# Patient Record
Sex: FEMALE | Race: BLACK OR AFRICAN AMERICAN | Employment: FULL TIME | URBAN - METROPOLITAN AREA
[De-identification: names, ages, dates, MRNs, and addresses within clinical notes are randomized per-mention and may not be internally consistent; named-entity substitution may affect disease eponyms.]

---

## 2018-06-26 ENCOUNTER — APPOINTMENT (OUTPATIENT)
Dept: GENERAL RADIOLOGY | Age: 58
End: 2018-06-26
Attending: PHYSICIAN ASSISTANT
Payer: SELF-PAY

## 2018-06-26 ENCOUNTER — HOSPITAL ENCOUNTER (EMERGENCY)
Age: 58
Discharge: HOME | End: 2018-06-26
Attending: EMERGENCY MEDICINE | Admitting: EMERGENCY MEDICINE
Payer: SELF-PAY

## 2018-06-26 VITALS
TEMPERATURE: 98 F | SYSTOLIC BLOOD PRESSURE: 166 MMHG | WEIGHT: 158 LBS | OXYGEN SATURATION: 96 % | DIASTOLIC BLOOD PRESSURE: 85 MMHG | RESPIRATION RATE: 16 BRPM | BODY MASS INDEX: 26.33 KG/M2 | HEIGHT: 65 IN | HEART RATE: 105 BPM

## 2018-06-26 DIAGNOSIS — J44.1 ACUTE EXACERBATION OF CHRONIC OBSTRUCTIVE PULMONARY DISEASE (COPD) (HCC): Primary | ICD-10-CM

## 2018-06-26 LAB
ANION GAP SERPL CALC-SCNC: 8 MMOL/L (ref 5–15)
BASOPHILS # BLD: 0 K/UL (ref 0–0.1)
BASOPHILS NFR BLD: 0 % (ref 0–1)
BUN SERPL-MCNC: 9 MG/DL (ref 6–20)
BUN/CREAT SERPL: 12 (ref 12–20)
CALCIUM SERPL-MCNC: 8.7 MG/DL (ref 8.5–10.1)
CHLORIDE SERPL-SCNC: 109 MMOL/L (ref 97–108)
CO2 SERPL-SCNC: 26 MMOL/L (ref 21–32)
CREAT SERPL-MCNC: 0.76 MG/DL (ref 0.55–1.02)
DIFFERENTIAL METHOD BLD: ABNORMAL
EOSINOPHIL # BLD: 0.6 K/UL (ref 0–0.4)
EOSINOPHIL NFR BLD: 7 % (ref 0–7)
ERYTHROCYTE [DISTWIDTH] IN BLOOD BY AUTOMATED COUNT: 13.4 % (ref 11.5–14.5)
GLUCOSE SERPL-MCNC: 86 MG/DL (ref 65–100)
HCT VFR BLD AUTO: 42.2 % (ref 35–47)
HGB BLD-MCNC: 13.7 G/DL (ref 11.5–16)
IMM GRANULOCYTES # BLD: 0 K/UL (ref 0–0.04)
IMM GRANULOCYTES NFR BLD AUTO: 0 % (ref 0–0.5)
LYMPHOCYTES # BLD: 3.2 K/UL (ref 0.8–3.5)
LYMPHOCYTES NFR BLD: 39 % (ref 12–49)
MCH RBC QN AUTO: 30.4 PG (ref 26–34)
MCHC RBC AUTO-ENTMCNC: 32.5 G/DL (ref 30–36.5)
MCV RBC AUTO: 93.8 FL (ref 80–99)
MONOCYTES # BLD: 0.6 K/UL (ref 0–1)
MONOCYTES NFR BLD: 7 % (ref 5–13)
NEUTS SEG # BLD: 3.8 K/UL (ref 1.8–8)
NEUTS SEG NFR BLD: 46 % (ref 32–75)
NRBC # BLD: 0 K/UL (ref 0–0.01)
NRBC BLD-RTO: 0 PER 100 WBC
PLATELET # BLD AUTO: 250 K/UL (ref 150–400)
PMV BLD AUTO: 10 FL (ref 8.9–12.9)
POTASSIUM SERPL-SCNC: 3.2 MMOL/L (ref 3.5–5.1)
RBC # BLD AUTO: 4.5 M/UL (ref 3.8–5.2)
SODIUM SERPL-SCNC: 143 MMOL/L (ref 136–145)
TROPONIN I SERPL-MCNC: <0.05 NG/ML
WBC # BLD AUTO: 8.1 K/UL (ref 3.6–11)

## 2018-06-26 PROCEDURE — 74011250637 HC RX REV CODE- 250/637: Performed by: EMERGENCY MEDICINE

## 2018-06-26 PROCEDURE — 77030029684 HC NEB SM VOL KT MONA -A

## 2018-06-26 PROCEDURE — 93005 ELECTROCARDIOGRAM TRACING: CPT

## 2018-06-26 PROCEDURE — 74011000250 HC RX REV CODE- 250: Performed by: EMERGENCY MEDICINE

## 2018-06-26 PROCEDURE — 84484 ASSAY OF TROPONIN QUANT: CPT | Performed by: PHYSICIAN ASSISTANT

## 2018-06-26 PROCEDURE — 71046 X-RAY EXAM CHEST 2 VIEWS: CPT

## 2018-06-26 PROCEDURE — 85025 COMPLETE CBC W/AUTO DIFF WBC: CPT | Performed by: PHYSICIAN ASSISTANT

## 2018-06-26 PROCEDURE — 36415 COLL VENOUS BLD VENIPUNCTURE: CPT | Performed by: PHYSICIAN ASSISTANT

## 2018-06-26 PROCEDURE — 74011636637 HC RX REV CODE- 636/637: Performed by: EMERGENCY MEDICINE

## 2018-06-26 PROCEDURE — 99283 EMERGENCY DEPT VISIT LOW MDM: CPT

## 2018-06-26 PROCEDURE — 80048 BASIC METABOLIC PNL TOTAL CA: CPT | Performed by: PHYSICIAN ASSISTANT

## 2018-06-26 PROCEDURE — 94640 AIRWAY INHALATION TREATMENT: CPT

## 2018-06-26 RX ORDER — GUAIFENESIN 600 MG/1
600 TABLET, EXTENDED RELEASE ORAL EVERY 12 HOURS
Status: DISCONTINUED | OUTPATIENT
Start: 2018-06-26 | End: 2018-06-27 | Stop reason: HOSPADM

## 2018-06-26 RX ORDER — AZITHROMYCIN 250 MG/1
TABLET, FILM COATED ORAL
Qty: 6 TAB | Refills: 0 | Status: SHIPPED | OUTPATIENT
Start: 2018-06-26 | End: 2018-07-01

## 2018-06-26 RX ORDER — PREDNISONE 20 MG/1
60 TABLET ORAL DAILY
Qty: 12 TAB | Refills: 0 | Status: SHIPPED | OUTPATIENT
Start: 2018-06-26 | End: 2018-06-30

## 2018-06-26 RX ORDER — IPRATROPIUM BROMIDE AND ALBUTEROL SULFATE 2.5; .5 MG/3ML; MG/3ML
3 SOLUTION RESPIRATORY (INHALATION)
Status: COMPLETED | OUTPATIENT
Start: 2018-06-26 | End: 2018-06-26

## 2018-06-26 RX ORDER — GUAIFENESIN 600 MG/1
600 TABLET, EXTENDED RELEASE ORAL 2 TIMES DAILY
Qty: 20 TAB | Refills: 0 | Status: SHIPPED | OUTPATIENT
Start: 2018-06-26 | End: 2022-03-24

## 2018-06-26 RX ORDER — ALBUTEROL SULFATE 0.83 MG/ML
2.5 SOLUTION RESPIRATORY (INHALATION)
Qty: 24 EACH | Refills: 0 | Status: SHIPPED | OUTPATIENT
Start: 2018-06-26

## 2018-06-26 RX ORDER — PREDNISONE 20 MG/1
60 TABLET ORAL
Status: COMPLETED | OUTPATIENT
Start: 2018-06-26 | End: 2018-06-26

## 2018-06-26 RX ADMIN — ALBUTEROL SULFATE 1 DOSE: 2.5 SOLUTION RESPIRATORY (INHALATION) at 19:32

## 2018-06-26 RX ADMIN — PREDNISONE 60 MG: 20 TABLET ORAL at 20:34

## 2018-06-26 RX ADMIN — IPRATROPIUM BROMIDE AND ALBUTEROL SULFATE 3 ML: .5; 3 SOLUTION RESPIRATORY (INHALATION) at 19:55

## 2018-06-26 RX ADMIN — GUAIFENESIN 600 MG: 600 TABLET, EXTENDED RELEASE ORAL at 20:34

## 2018-06-26 NOTE — ED PROVIDER NOTES
HPI Comments: 56 y.o. female with no significant past medical history who presents ambulatory from home with chief complaint of shortness of breath. Patient reports 2-day history of SOB, worse with exertion, with associated wheezing, chest tightness, and dry cough. Patient used breathing treatment and rescue inhaler last night with some relief. Patient states episodes are typically precipitated by rain. Patient endorses history of bronchitis. She was evaluated at [facility] ED last month for the same and prescribed steroid therapy and \"cough syrup (she) took Q12 hrs\" with relief. Patient denies history of asthma, COPD and cardiac disease. She is not followed by a PCP. She is allergic to sulfa. She specifically denies fevers, n/v/d, and back pain. There are no other acute medical concerns at this time. Social hx: Former tobacco smoker (quit 3/2018); Denies EtOH use; Denies illicit drug use  PCP: Matty Chang MD    Note written by Quentin Fitch, as dictated by Valerio Leahy MD 6:35 PM        The history is provided by the patient. No  was used. History reviewed. No pertinent past medical history. History reviewed. No pertinent surgical history. History reviewed. No pertinent family history. Social History     Social History    Marital status:      Spouse name: N/A    Number of children: N/A    Years of education: N/A     Occupational History    Not on file. Social History Main Topics    Smoking status: Former Smoker     Quit date: 2/26/2018    Smokeless tobacco: Never Used    Alcohol use No    Drug use: No    Sexual activity: Not on file     Other Topics Concern    Not on file     Social History Narrative    No narrative on file         ALLERGIES: Sulfa (sulfonamide antibiotics)    Review of Systems   Constitutional: Negative for fever. HENT: Negative for facial swelling. Eyes: Negative for visual disturbance.    Respiratory: Positive for cough, chest tightness, shortness of breath and wheezing. Cardiovascular: Negative for chest pain. Gastrointestinal: Negative for abdominal pain, diarrhea, nausea and vomiting. Genitourinary: Negative for dysuria. Musculoskeletal: Negative for arthralgias and back pain. Skin: Negative for rash. Neurological: Negative for dizziness. Hematological: Negative for adenopathy. Psychiatric/Behavioral: Negative for suicidal ideas. All other systems reviewed and are negative. Vitals:    06/26/18 1754   BP: 163/89   Pulse: (!) 105   Resp: 22   Temp: 97.8 °F (36.6 °C)   SpO2: 99%   Weight: 71.7 kg (158 lb)   Height: 5' 5\" (1.651 m)            Physical Exam   Constitutional: She is oriented to person, place, and time. She appears well-developed and well-nourished. No distress. HENT:   Head: Normocephalic and atraumatic. Mouth/Throat: Oropharynx is clear and moist.   Eyes: Pupils are equal, round, and reactive to light. No scleral icterus. Neck: Normal range of motion. Neck supple. No thyromegaly present. Cardiovascular: Normal rate, regular rhythm, normal heart sounds and intact distal pulses. No murmur heard. Pulmonary/Chest: Effort normal. No respiratory distress. She has decreased breath sounds. She has wheezes (inspiratory and expiratory). Abdominal: Soft. Bowel sounds are normal. She exhibits no distension. There is no tenderness. Musculoskeletal: Normal range of motion. She exhibits no edema. Neurological: She is alert and oriented to person, place, and time. Skin: Skin is warm and dry. No rash noted. She is not diaphoretic. Nursing note and vitals reviewed. Note written by Quentin Atkins, as dictated by Jia Shetty MD 6:35 PM    MDM  Number of Diagnoses or Management Options  Acute exacerbation of chronic obstructive pulmonary disease (COPD) [facility]):   Diagnosis management comments: A:  Wheezing and coughing since yesterday.   Wheezing and decreased breath sounds on exam.  VS stable with slight tachycardia. Pt has h/o bronchitis. Quit cigs 3 months ago. P:  ecg  cxr  Labs  duoneb  Prednisone  reassess        ED Course       Procedures    ED EKG interpretation:  Rhythm: sinus tach Rate (approx.): 104. Axis: normal.  ST segment:  No concerning ST elevations or depressions. This EKG was interpreted by Anoop Sow MD,ED Provider. Chest X-ray, 2 view:  Chest xray, PA and Lat, shows no signs of acute disease. This CXR was interpreted by Anoop Sow MD, ED Provider. Labs unremarkable. 8:58 PM  Pt still wheezing after 2 neb treatments and prednisone. Slightly improved from before. VS stable. Pt offered admission for add'l nebs and observation but she prefers to be discharge home. She has alb nebs at home. Agrees to return if symptoms worsen.

## 2018-06-26 NOTE — ED NOTES

## 2018-06-26 NOTE — ED TRIAGE NOTES
Pt reports onset of SOB yesterday, ZELAYA with ambulation into ED, congested. Dry cough with chest pain. Denies dizziness, N/V, fevers or chills.

## 2018-06-27 LAB
ATRIAL RATE: 104 BPM
CALCULATED P AXIS, ECG09: 30 DEGREES
CALCULATED R AXIS, ECG10: 49 DEGREES
CALCULATED T AXIS, ECG11: 63 DEGREES
DIAGNOSIS, 93000: NORMAL
P-R INTERVAL, ECG05: 122 MS
Q-T INTERVAL, ECG07: 364 MS
QRS DURATION, ECG06: 78 MS
QTC CALCULATION (BEZET), ECG08: 478 MS
VENTRICULAR RATE, ECG03: 104 BPM

## 2018-06-27 NOTE — ED NOTES
Dr. Fide Mullins gave and reviewed discharge instructions with patient. Patient verbalizes understanding of discharge instructions. Pt alert and oriented, appears in no acute distress, respirations equal and unlabored. Ambulatory upon discharge with steady gait.

## 2018-06-27 NOTE — DISCHARGE INSTRUCTIONS
Chronic Obstructive Pulmonary Disease (COPD): Care Instructions  Your Care Instructions    Chronic obstructive pulmonary disease (COPD) is a general term for a group of lung diseases, including emphysema and chronic bronchitis. People with COPD have decreased airflow in and out of the lungs, which makes it hard to breathe. The airways also can get clogged with thick mucus. Cigarette smoking is a major cause of COPD. Although there is no cure for COPD, you can slow its progress. Following your treatment plan and taking care of yourself can help you feel better and live longer. Follow-up care is a key part of your treatment and safety. Be sure to make and go to all appointments, and call your doctor if you are having problems. It's also a good idea to know your test results and keep a list of the medicines you take. How can you care for yourself at home? ?Staying healthy  ? · Do not smoke. This is the most important step you can take to prevent more damage to your lungs. If you need help quitting, talk to your doctor about stop-smoking programs and medicines. These can increase your chances of quitting for good. ? · Avoid colds and flu. Get a pneumococcal vaccine shot. If you have had one before, ask your doctor whether you need a second dose. Get the flu vaccine every fall. If you must be around people with colds or the flu, wash your hands often. ? · Avoid secondhand smoke, air pollution, and high altitudes. Also avoid cold, dry air and hot, humid air. Stay at home with your windows closed when air pollution is bad. ?Medicines and oxygen therapy  ? · Take your medicines exactly as prescribed. Call your doctor if you think you are having a problem with your medicine. ? · You may be taking medicines such as:  ¨ Bronchodilators. These help open your airways and make breathing easier. Bronchodilators are either short-acting (work for 6 to 9 hours) or long-acting (work for 24 hours).  You inhale most bronchodilators, so they start to act quickly. Always carry your quick-relief inhaler with you in case you need it while you are away from home. ¨ Corticosteroids (prednisone, budesonide). These reduce airway inflammation. They come in pill or inhaled form. You must take these medicines every day for them to work well. ? · A spacer may help you get more inhaled medicine to your lungs. Ask your doctor or pharmacist if a spacer is right for you. If it is, ask how to use it properly. ? · Do not take any vitamins, over-the-counter medicine, or herbal products without talking to your doctor first.   ? · If your doctor prescribed antibiotics, take them as directed. Do not stop taking them just because you feel better. You need to take the full course of antibiotics. ? · Oxygen therapy boosts the amount of oxygen in your blood and helps you breathe easier. Use the flow rate your doctor has recommended, and do not change it without talking to your doctor first.   Activity  ? · Get regular exercise. Walking is an easy way to get exercise. Start out slowly, and walk a little more each day. ? · Pay attention to your breathing. You are exercising too hard if you cannot talk while you are exercising. ? · Take short rest breaks when doing household chores and other activities. ? · Learn breathing methods-such as breathing through pursed lips-to help you become less short of breath. ? · If your doctor has not set you up with a pulmonary rehabilitation program, talk to him or her about whether rehab is right for you. Rehab includes exercise programs, education about your disease and how to manage it, help with diet and other changes, and emotional support. Diet  ? · Eat regular, healthy meals. Use bronchodilators about 1 hour before you eat to make it easier to eat. Eat several small meals instead of three large ones. Drink beverages at the end of the meal. Avoid foods that are hard to chew.    ? · Eat foods that contain protein so that you do not lose muscle mass. ? · Talk with your doctor if you gain too much weight or if you lose weight without trying. ?Mental health  ? · Talk to your family, friends, or a therapist about your feelings. It is normal to feel frightened, angry, hopeless, helpless, and even guilty. Talking openly about bad feelings can help you cope. If these feelings last, talk to your doctor. When should you call for help? Call 911 anytime you think you may need emergency care. For example, call if:  ? · You have severe trouble breathing. ?Call your doctor now or seek immediate medical care if:  ? · You have new or worse trouble breathing. ? · You cough up blood. ? · You have a fever. ? Watch closely for changes in your health, and be sure to contact your doctor if:  ? · You cough more deeply or more often, especially if you notice more mucus or a change in the color of your mucus. ? · You have new or worse swelling in your legs or belly. ? · You are not getting better as expected. Where can you learn more? Go to http://popPureCarsdeidra.info/. Jemma Enrique in the search box to learn more about \"Chronic Obstructive Pulmonary Disease (COPD): Care Instructions. \"  Current as of: May 12, 2017  Content Version: 11.4  © 4988-8573 [employer]. Care instructions adapted under license by [employer] (which disclaims liability or warranty for this information). If you have questions about a medical condition or this instruction, always ask your healthcare professional. [place] [address] any warranty or liability for your use of this information. We hope that we have addressed all of your medical concerns. The examination and treatment you received in the Emergency Department were for an emergent problem and were not intended as complete care. It is important that you follow up with your healthcare provider(s) for ongoing care.  If your symptoms worsen or do not improve as expected, and you are unable to reach your usual health care provider(s), you should return to the Emergency Department. Today's healthcare is undergoing tremendous change, and patient satisfaction surveys are one of the many tools to assess the quality of medical care. You may receive a survey from the [employer] organization regarding your experience in the Emergency Department. I hope that your experience has been completely positive, particularly the medical care that I provided. As such, please participate in the survey; anything less than excellent does not meet my expectations or intentions. [facility] and [facility] participate in nationally recognized quality of care measures. If your blood pressure is greater than 120/80, as reported below, we urge that you seek medical care to address the potential of high blood pressure, commonly known as hypertension. Hypertension can be hereditary or can be caused by certain medical conditions, pain, stress, or \"white coat syndrome. \"       Please make an appointment with your health care provider(s) for follow up of your Emergency Department visit. VITALS:   Patient Vitals for the past 8 hrs:   Temp Pulse Resp BP SpO2   06/26/18 1932 - - - - 99 %   06/26/18 1754 97.8 °F (36.6 °C) (!) 105 22 163/89 99 %          Thank you for allowing us to provide you with medical care today. We realize that you have many choices for your emergency care needs. Please choose us in the future for any continued health care needs.       MD STERLING [facility]: [phone]            Recent Results (from the past 24 hour(s))   EKG, 12 LEAD, INITIAL    Collection Time: 06/26/18  6:00 PM   Result Value Ref Range    Ventricular Rate 104 BPM    Atrial Rate 104 BPM    P-R Interval 122 ms    QRS Duration 78 ms    Q-T Interval 364 ms    QTC Calculation (Bezet) 478 ms    Calculated P Axis 30 degrees    Calculated R Axis 49 degrees    Calculated T Axis 63 degrees    Diagnosis       Sinus tachycardia  Nonspecific ST and T wave abnormality     CBC WITH AUTOMATED DIFF    Collection Time: 06/26/18  6:04 PM   Result Value Ref Range    WBC 8.1 3.6 - 11.0 K/uL    RBC 4.50 3.80 - 5.20 M/uL    HGB 13.7 11.5 - 16.0 g/dL    HCT 42.2 35.0 - 47.0 %    MCV 93.8 80.0 - 99.0 FL    MCH 30.4 26.0 - 34.0 PG    MCHC 32.5 30.0 - 36.5 g/dL    RDW 13.4 11.5 - 14.5 %    PLATELET 573 873 - 859 K/uL    MPV 10.0 8.9 - 12.9 FL    NRBC 0.0 0  WBC    ABSOLUTE NRBC 0.00 0.00 - 0.01 K/uL    NEUTROPHILS 46 32 - 75 %    LYMPHOCYTES 39 12 - 49 %    MONOCYTES 7 5 - 13 %    EOSINOPHILS 7 0 - 7 %    BASOPHILS 0 0 - 1 %    IMMATURE GRANULOCYTES 0 0.0 - 0.5 %    ABS. NEUTROPHILS 3.8 1.8 - 8.0 K/UL    ABS. LYMPHOCYTES 3.2 0.8 - 3.5 K/UL    ABS. MONOCYTES 0.6 0.0 - 1.0 K/UL    ABS. EOSINOPHILS 0.6 (H) 0.0 - 0.4 K/UL    ABS. BASOPHILS 0.0 0.0 - 0.1 K/UL    ABS. IMM. GRANS. 0.0 0.00 - 0.04 K/UL    DF AUTOMATED     METABOLIC PANEL, BASIC    Collection Time: 06/26/18  6:04 PM   Result Value Ref Range    Sodium 143 136 - 145 mmol/L    Potassium 3.2 (L) 3.5 - 5.1 mmol/L    Chloride 109 (H) 97 - 108 mmol/L    CO2 26 21 - 32 mmol/L    Anion gap 8 5 - 15 mmol/L    Glucose 86 65 - 100 mg/dL    BUN 9 6 - 20 MG/DL    Creatinine 0.76 0.55 - 1.02 MG/DL    BUN/Creatinine ratio 12 12 - 20      GFR est AA >60 >60 ml/min/1.73m2    GFR est non-AA >60 >60 ml/min/1.73m2    Calcium 8.7 8.5 - 10.1 MG/DL   TROPONIN I    Collection Time: 06/26/18  6:04 PM   Result Value Ref Range    Troponin-I, Qt. <0.05 <0.05 ng/mL       Xr Chest Pa Lat    Result Date: 6/26/2018  EXAM:  XR CHEST PA LAT INDICATION:   shortness of breath COMPARISON: 2008. FINDINGS: PA and lateral radiographs of the chest demonstrate coarsening of the lung markings in the right lower lobe.  The cardiac and mediastinal contours and pulmonary vascularity are normal.  The bones and soft tissues are within normal limits.      IMPRESSION: Right lower lobe bronchitis versus asthma versus subsegmental atelectasis

## 2022-03-24 ENCOUNTER — OFFICE VISIT (OUTPATIENT)
Dept: ORTHOPEDIC SURGERY | Age: 62
End: 2022-03-24
Payer: MEDICAID

## 2022-03-24 VITALS — WEIGHT: 158 LBS | HEIGHT: 60 IN | BODY MASS INDEX: 31.02 KG/M2

## 2022-03-24 DIAGNOSIS — M25.512 ACUTE PAIN OF LEFT SHOULDER: Primary | ICD-10-CM

## 2022-03-24 DIAGNOSIS — M54.12 CERVICAL RADICULOPATHY: ICD-10-CM

## 2022-03-24 DIAGNOSIS — M75.42 IMPINGEMENT SYNDROME OF LEFT SHOULDER: ICD-10-CM

## 2022-03-24 PROCEDURE — 99203 OFFICE O/P NEW LOW 30 MIN: CPT | Performed by: ORTHOPAEDIC SURGERY

## 2022-03-24 RX ORDER — DICLOFENAC SODIUM 75 MG/1
75 TABLET, DELAYED RELEASE ORAL 2 TIMES DAILY
Qty: 60 TABLET | Refills: 3 | Status: SHIPPED | OUTPATIENT
Start: 2022-03-24 | End: 2022-07-26

## 2022-03-24 RX ORDER — MONTELUKAST SODIUM 10 MG/1
10 TABLET ORAL DAILY
COMMUNITY
Start: 2022-03-10

## 2022-03-24 NOTE — Clinical Note
3/24/2022    Patient: Nehemiah Tom   YOB: 1960   Date of Visit: 3/24/2022     Richi Davis MD  Via     Dear Richi Davis MD,      Thank you for referring Ms. Nehemiah Tom to [facility] for evaluation. My notes for this consultation are attached. If you have questions, please do not hesitate to call me. I look forward to following your patient along with you.       Sincerely,    Rashawn Macias, DO home
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
## 2022-05-07 ENCOUNTER — HOSPITAL ENCOUNTER (INPATIENT)
Age: 62
LOS: 1 days | Discharge: LEFT BEFORE DISCHARGE | DRG: 420 | End: 2022-05-09
Attending: EMERGENCY MEDICINE | Admitting: INTERNAL MEDICINE
Payer: MEDICAID

## 2022-05-07 DIAGNOSIS — E11.10 DIABETIC KETOACIDOSIS WITHOUT COMA ASSOCIATED WITH TYPE 2 DIABETES MELLITUS (HCC): Primary | ICD-10-CM

## 2022-05-07 DIAGNOSIS — R05.9 COUGH: ICD-10-CM

## 2022-05-07 LAB
ALBUMIN SERPL-MCNC: 3.5 G/DL (ref 3.5–5)
ALBUMIN/GLOB SERPL: 0.8 {RATIO} (ref 1.1–2.2)
ALP SERPL-CCNC: 129 U/L (ref 45–117)
ALT SERPL-CCNC: 20 U/L (ref 12–78)
ANION GAP SERPL CALC-SCNC: 20 MMOL/L (ref 5–15)
APPEARANCE UR: CLEAR
AST SERPL-CCNC: 9 U/L (ref 15–37)
BACTERIA URNS QL MICRO: NEGATIVE /HPF
BASE DEFICIT BLDV-SCNC: 13.2 MMOL/L
BASOPHILS # BLD: 0.1 K/UL (ref 0–0.1)
BASOPHILS NFR BLD: 1 % (ref 0–1)
BILIRUB SERPL-MCNC: 0.5 MG/DL (ref 0.2–1)
BILIRUB UR QL: NEGATIVE
BUN SERPL-MCNC: 10 MG/DL (ref 6–20)
BUN/CREAT SERPL: 11 (ref 12–20)
CALCIUM SERPL-MCNC: 10.2 MG/DL (ref 8.5–10.1)
CHLORIDE SERPL-SCNC: 98 MMOL/L (ref 97–108)
CO2 SERPL-SCNC: 13 MMOL/L (ref 21–32)
COLOR UR: ABNORMAL
COMMENT, HOLDF: NORMAL
CREAT SERPL-MCNC: 0.93 MG/DL (ref 0.55–1.02)
D DIMER PPP FEU-MCNC: 0.57 MG/L FEU (ref 0–0.65)
DIFFERENTIAL METHOD BLD: ABNORMAL
EOSINOPHIL # BLD: 0 K/UL (ref 0–0.4)
EOSINOPHIL NFR BLD: 0 % (ref 0–7)
EPITH CASTS URNS QL MICRO: ABNORMAL /LPF
ERYTHROCYTE [DISTWIDTH] IN BLOOD BY AUTOMATED COUNT: 13.3 % (ref 11.5–14.5)
GLOBULIN SER CALC-MCNC: 4.3 G/DL (ref 2–4)
GLUCOSE BLD STRIP.AUTO-MCNC: 325 MG/DL (ref 65–117)
GLUCOSE SERPL-MCNC: 386 MG/DL (ref 65–100)
GLUCOSE UR STRIP.AUTO-MCNC: >1000 MG/DL
HCO3 BLDV-SCNC: 11.2 MMOL/L (ref 23–28)
HCT VFR BLD AUTO: 45.3 % (ref 35–47)
HGB BLD-MCNC: 15.6 G/DL (ref 11.5–16)
HGB UR QL STRIP: ABNORMAL
HYALINE CASTS URNS QL MICRO: ABNORMAL /LPF (ref 0–2)
IMM GRANULOCYTES # BLD AUTO: 0 K/UL
IMM GRANULOCYTES NFR BLD AUTO: 0 %
KETONES UR QL STRIP.AUTO: >80 MG/DL
LACTATE SERPL-SCNC: 0.9 MMOL/L (ref 0.4–2)
LEUKOCYTE ESTERASE UR QL STRIP.AUTO: NEGATIVE
LYMPHOCYTES # BLD: 4 K/UL (ref 0.8–3.5)
LYMPHOCYTES NFR BLD: 33 % (ref 12–49)
MCH RBC QN AUTO: 31.1 PG (ref 26–34)
MCHC RBC AUTO-ENTMCNC: 34.4 G/DL (ref 30–36.5)
MCV RBC AUTO: 90.4 FL (ref 80–99)
METAMYELOCYTES NFR BLD MANUAL: 1 %
MONOCYTES # BLD: 0.5 K/UL (ref 0–1)
MONOCYTES NFR BLD: 4 % (ref 5–13)
MYELOCYTES NFR BLD MANUAL: 2 %
NEUTS SEG # BLD: 7.1 K/UL (ref 1.8–8)
NEUTS SEG NFR BLD: 59 % (ref 32–75)
NITRITE UR QL STRIP.AUTO: NEGATIVE
NRBC # BLD: 0 K/UL (ref 0–0.01)
NRBC BLD-RTO: 0 PER 100 WBC
PCO2 BLDV: 22.8 MMHG (ref 41–51)
PH BLDV: 7.3 [PH] (ref 7.32–7.42)
PH UR STRIP: 5 [PH] (ref 5–8)
PLATELET # BLD AUTO: 303 K/UL (ref 150–400)
PMV BLD AUTO: 10.8 FL (ref 8.9–12.9)
PO2 BLDV: 25 MMHG (ref 25–40)
POTASSIUM SERPL-SCNC: 3.4 MMOL/L (ref 3.5–5.1)
PROT SERPL-MCNC: 7.8 G/DL (ref 6.4–8.2)
PROT UR STRIP-MCNC: 30 MG/DL
RBC # BLD AUTO: 5.01 M/UL (ref 3.8–5.2)
RBC #/AREA URNS HPF: ABNORMAL /HPF (ref 0–5)
RBC MORPH BLD: ABNORMAL
SAMPLES BEING HELD,HOLD: NORMAL
SAO2 % BLDV: 40.4 % (ref 65–88)
SERVICE CMNT-IMP: ABNORMAL
SERVICE CMNT-IMP: ABNORMAL
SODIUM SERPL-SCNC: 131 MMOL/L (ref 136–145)
SP GR UR REFRACTOMETRY: 1.02 (ref 1–1.03)
SPECIMEN TYPE: ABNORMAL
TROPONIN-HIGH SENSITIVITY: 12 NG/L (ref 0–51)
UR CULT HOLD, URHOLD: NORMAL
UROBILINOGEN UR QL STRIP.AUTO: 1 EU/DL (ref 0.2–1)
WBC # BLD AUTO: 12.1 K/UL (ref 3.6–11)
WBC URNS QL MICRO: ABNORMAL /HPF (ref 0–4)

## 2022-05-07 PROCEDURE — 82803 BLOOD GASES ANY COMBINATION: CPT

## 2022-05-07 PROCEDURE — 85025 COMPLETE CBC W/AUTO DIFF WBC: CPT

## 2022-05-07 PROCEDURE — 80053 COMPREHEN METABOLIC PANEL: CPT

## 2022-05-07 PROCEDURE — 84484 ASSAY OF TROPONIN QUANT: CPT

## 2022-05-07 PROCEDURE — 83605 ASSAY OF LACTIC ACID: CPT

## 2022-05-07 PROCEDURE — 74011250636 HC RX REV CODE- 250/636: Performed by: EMERGENCY MEDICINE

## 2022-05-07 PROCEDURE — 36415 COLL VENOUS BLD VENIPUNCTURE: CPT

## 2022-05-07 PROCEDURE — 85379 FIBRIN DEGRADATION QUANT: CPT

## 2022-05-07 PROCEDURE — 82962 GLUCOSE BLOOD TEST: CPT

## 2022-05-07 PROCEDURE — 99285 EMERGENCY DEPT VISIT HI MDM: CPT

## 2022-05-07 PROCEDURE — 81001 URINALYSIS AUTO W/SCOPE: CPT

## 2022-05-07 PROCEDURE — 93005 ELECTROCARDIOGRAM TRACING: CPT

## 2022-05-07 RX ORDER — MAGNESIUM SULFATE 100 %
4 CRYSTALS MISCELLANEOUS AS NEEDED
Status: DISCONTINUED | OUTPATIENT
Start: 2022-05-07 | End: 2022-05-08

## 2022-05-07 RX ORDER — DEXTROSE MONOHYDRATE 100 MG/ML
0-250 INJECTION, SOLUTION INTRAVENOUS AS NEEDED
Status: DISCONTINUED | OUTPATIENT
Start: 2022-05-07 | End: 2022-05-08

## 2022-05-07 RX ORDER — INSULIN LISPRO 100 [IU]/ML
INJECTION, SOLUTION INTRAVENOUS; SUBCUTANEOUS
Status: DISCONTINUED | OUTPATIENT
Start: 2022-05-08 | End: 2022-05-08

## 2022-05-07 RX ADMIN — SODIUM CHLORIDE 1000 ML: 9 INJECTION, SOLUTION INTRAVENOUS at 21:15

## 2022-05-08 PROBLEM — E11.10 DIABETIC KETOACIDOSIS (HCC): Status: ACTIVE | Noted: 2022-05-08

## 2022-05-08 LAB
ADMINISTERED INITIALS, ADMINIT: NORMAL
ANION GAP SERPL CALC-SCNC: 13 MMOL/L (ref 5–15)
ANION GAP SERPL CALC-SCNC: 15 MMOL/L (ref 5–15)
ANION GAP SERPL CALC-SCNC: 9 MMOL/L (ref 5–15)
ATRIAL RATE: 107 BPM
BUN SERPL-MCNC: 7 MG/DL (ref 6–20)
BUN SERPL-MCNC: 7 MG/DL (ref 6–20)
BUN SERPL-MCNC: 9 MG/DL (ref 6–20)
BUN/CREAT SERPL: 11 (ref 12–20)
BUN/CREAT SERPL: 12 (ref 12–20)
BUN/CREAT SERPL: 15 (ref 12–20)
CALCIUM SERPL-MCNC: 8.5 MG/DL (ref 8.5–10.1)
CALCIUM SERPL-MCNC: 8.8 MG/DL (ref 8.5–10.1)
CALCIUM SERPL-MCNC: 9.5 MG/DL (ref 8.5–10.1)
CALCULATED P AXIS, ECG09: 49 DEGREES
CALCULATED R AXIS, ECG10: 20 DEGREES
CALCULATED T AXIS, ECG11: 95 DEGREES
CHLORIDE SERPL-SCNC: 107 MMOL/L (ref 97–108)
CHLORIDE SERPL-SCNC: 111 MMOL/L (ref 97–108)
CHLORIDE SERPL-SCNC: 115 MMOL/L (ref 97–108)
CO2 SERPL-SCNC: 15 MMOL/L (ref 21–32)
CO2 SERPL-SCNC: 15 MMOL/L (ref 21–32)
CO2 SERPL-SCNC: 16 MMOL/L (ref 21–32)
CREAT SERPL-MCNC: 0.59 MG/DL (ref 0.55–1.02)
CREAT SERPL-MCNC: 0.61 MG/DL (ref 0.55–1.02)
CREAT SERPL-MCNC: 0.62 MG/DL (ref 0.55–1.02)
D50 ADMINISTERED, D50ADM: 0 ML
D50 ORDER, D50ORD: 0 ML
DIAGNOSIS, 93000: NORMAL
EST. AVERAGE GLUCOSE BLD GHB EST-MCNC: 349 MG/DL
GLSCOM COMMENTS: NORMAL
GLUCOSE BLD STRIP.AUTO-MCNC: 124 MG/DL (ref 65–117)
GLUCOSE BLD STRIP.AUTO-MCNC: 153 MG/DL (ref 65–117)
GLUCOSE BLD STRIP.AUTO-MCNC: 154 MG/DL (ref 65–117)
GLUCOSE BLD STRIP.AUTO-MCNC: 177 MG/DL (ref 65–117)
GLUCOSE BLD STRIP.AUTO-MCNC: 180 MG/DL (ref 65–117)
GLUCOSE BLD STRIP.AUTO-MCNC: 186 MG/DL (ref 65–117)
GLUCOSE BLD STRIP.AUTO-MCNC: 187 MG/DL (ref 65–117)
GLUCOSE BLD STRIP.AUTO-MCNC: 189 MG/DL (ref 65–117)
GLUCOSE BLD STRIP.AUTO-MCNC: 205 MG/DL (ref 65–117)
GLUCOSE BLD STRIP.AUTO-MCNC: 205 MG/DL (ref 65–117)
GLUCOSE BLD STRIP.AUTO-MCNC: 206 MG/DL (ref 65–117)
GLUCOSE BLD STRIP.AUTO-MCNC: 258 MG/DL (ref 65–117)
GLUCOSE BLD STRIP.AUTO-MCNC: 286 MG/DL (ref 65–117)
GLUCOSE BLD STRIP.AUTO-MCNC: 300 MG/DL (ref 65–117)
GLUCOSE BLD STRIP.AUTO-MCNC: 302 MG/DL (ref 65–117)
GLUCOSE BLD STRIP.AUTO-MCNC: 372 MG/DL (ref 65–117)
GLUCOSE BLD STRIP.AUTO-MCNC: 536 MG/DL (ref 65–117)
GLUCOSE SERPL-MCNC: 142 MG/DL (ref 65–100)
GLUCOSE SERPL-MCNC: 182 MG/DL (ref 65–100)
GLUCOSE SERPL-MCNC: 211 MG/DL (ref 65–100)
GLUCOSE, GLC: 124 MG/DL
GLUCOSE, GLC: 153 MG/DL
GLUCOSE, GLC: 154 MG/DL
GLUCOSE, GLC: 177 MG/DL
GLUCOSE, GLC: 186 MG/DL
GLUCOSE, GLC: 187 MG/DL
GLUCOSE, GLC: 189 MG/DL
GLUCOSE, GLC: 205 MG/DL
GLUCOSE, GLC: 205 MG/DL
GLUCOSE, GLC: 206 MG/DL
GLUCOSE, GLC: 258 MG/DL
GLUCOSE, GLC: 286 MG/DL
GLUCOSE, GLC: 300 MG/DL
GLUCOSE, GLC: 302 MG/DL
HBA1C MFR BLD: 13.8 % (ref 4–5.6)
HIGH TARGET, HITG: 250 MG/DL
INSULIN ADMINSTERED, INSADM: 0.6 UNITS/HOUR
INSULIN ADMINSTERED, INSADM: 0.9 UNITS/HOUR
INSULIN ADMINSTERED, INSADM: 0.9 UNITS/HOUR
INSULIN ADMINSTERED, INSADM: 1.2 UNITS/HOUR
INSULIN ADMINSTERED, INSADM: 1.3 UNITS/HOUR
INSULIN ADMINSTERED, INSADM: 1.5 UNITS/HOUR
INSULIN ADMINSTERED, INSADM: 2.4 UNITS/HOUR
INSULIN ADMINSTERED, INSADM: 2.6 UNITS/HOUR
INSULIN ADMINSTERED, INSADM: 2.9 UNITS/HOUR
INSULIN ADMINSTERED, INSADM: 3.8 UNITS/HOUR
INSULIN ADMINSTERED, INSADM: 4.4 UNITS/HOUR
INSULIN ADMINSTERED, INSADM: 4.5 UNITS/HOUR
INSULIN ADMINSTERED, INSADM: 4.8 UNITS/HOUR
INSULIN ADMINSTERED, INSADM: 5.9 UNITS/HOUR
INSULIN ORDER, INSORD: 0.6 UNITS/HOUR
INSULIN ORDER, INSORD: 0.9 UNITS/HOUR
INSULIN ORDER, INSORD: 0.9 UNITS/HOUR
INSULIN ORDER, INSORD: 1.2 UNITS/HOUR
INSULIN ORDER, INSORD: 1.3 UNITS/HOUR
INSULIN ORDER, INSORD: 1.5 UNITS/HOUR
INSULIN ORDER, INSORD: 2.4 UNITS/HOUR
INSULIN ORDER, INSORD: 2.6 UNITS/HOUR
INSULIN ORDER, INSORD: 2.9 UNITS/HOUR
INSULIN ORDER, INSORD: 3.8 UNITS/HOUR
INSULIN ORDER, INSORD: 4.4 UNITS/HOUR
INSULIN ORDER, INSORD: 4.5 UNITS/HOUR
INSULIN ORDER, INSORD: 4.8 UNITS/HOUR
INSULIN ORDER, INSORD: 5.9 UNITS/HOUR
LOW TARGET, LOT: 150 MG/DL
MAGNESIUM SERPL-MCNC: 1.8 MG/DL (ref 1.6–2.4)
MAGNESIUM SERPL-MCNC: 2 MG/DL (ref 1.6–2.4)
MINUTES UNTIL NEXT BG, NBG: 60 MIN
MULTIPLIER, MUL: 0.01
MULTIPLIER, MUL: 0.02
MULTIPLIER, MUL: 0.03
ORDER INITIALS, ORDINIT: NORMAL
P-R INTERVAL, ECG05: 130 MS
PHOSPHATE SERPL-MCNC: 1.5 MG/DL (ref 2.6–4.7)
POTASSIUM SERPL-SCNC: 2.7 MMOL/L (ref 3.5–5.1)
POTASSIUM SERPL-SCNC: 3 MMOL/L (ref 3.5–5.1)
POTASSIUM SERPL-SCNC: 3.8 MMOL/L (ref 3.5–5.1)
Q-T INTERVAL, ECG07: 304 MS
QRS DURATION, ECG06: 66 MS
QTC CALCULATION (BEZET), ECG08: 405 MS
SERVICE CMNT-IMP: ABNORMAL
SODIUM SERPL-SCNC: 137 MMOL/L (ref 136–145)
SODIUM SERPL-SCNC: 139 MMOL/L (ref 136–145)
SODIUM SERPL-SCNC: 140 MMOL/L (ref 136–145)
VENTRICULAR RATE, ECG03: 107 BPM

## 2022-05-08 PROCEDURE — 74011000250 HC RX REV CODE- 250: Performed by: INTERNAL MEDICINE

## 2022-05-08 PROCEDURE — 74011636637 HC RX REV CODE- 636/637: Performed by: INTERNAL MEDICINE

## 2022-05-08 PROCEDURE — 74011000258 HC RX REV CODE- 258: Performed by: INTERNAL MEDICINE

## 2022-05-08 PROCEDURE — 74011636637 HC RX REV CODE- 636/637: Performed by: HOSPITALIST

## 2022-05-08 PROCEDURE — 84100 ASSAY OF PHOSPHORUS: CPT

## 2022-05-08 PROCEDURE — 80048 BASIC METABOLIC PNL TOTAL CA: CPT

## 2022-05-08 PROCEDURE — 74011250637 HC RX REV CODE- 250/637: Performed by: INTERNAL MEDICINE

## 2022-05-08 PROCEDURE — 36415 COLL VENOUS BLD VENIPUNCTURE: CPT

## 2022-05-08 PROCEDURE — 83735 ASSAY OF MAGNESIUM: CPT

## 2022-05-08 PROCEDURE — 65610000006 HC RM INTENSIVE CARE

## 2022-05-08 PROCEDURE — 82962 GLUCOSE BLOOD TEST: CPT

## 2022-05-08 PROCEDURE — 74011250637 HC RX REV CODE- 250/637: Performed by: HOSPITALIST

## 2022-05-08 PROCEDURE — 74011250636 HC RX REV CODE- 250/636: Performed by: HOSPITALIST

## 2022-05-08 PROCEDURE — 74011000258 HC RX REV CODE- 258: Performed by: EMERGENCY MEDICINE

## 2022-05-08 PROCEDURE — 83036 HEMOGLOBIN GLYCOSYLATED A1C: CPT

## 2022-05-08 PROCEDURE — 74011250636 HC RX REV CODE- 250/636: Performed by: INTERNAL MEDICINE

## 2022-05-08 PROCEDURE — 74011636637 HC RX REV CODE- 636/637: Performed by: EMERGENCY MEDICINE

## 2022-05-08 RX ORDER — DEXTROSE MONOHYDRATE 100 MG/ML
0-250 INJECTION, SOLUTION INTRAVENOUS AS NEEDED
Status: DISCONTINUED | OUTPATIENT
Start: 2022-05-08 | End: 2022-05-08

## 2022-05-08 RX ORDER — MAGNESIUM SULFATE 100 %
4 CRYSTALS MISCELLANEOUS AS NEEDED
Status: DISCONTINUED | OUTPATIENT
Start: 2022-05-08 | End: 2022-05-10 | Stop reason: HOSPADM

## 2022-05-08 RX ORDER — DEXTROSE MONOHYDRATE 100 MG/ML
0-250 INJECTION, SOLUTION INTRAVENOUS AS NEEDED
Status: DISCONTINUED | OUTPATIENT
Start: 2022-05-08 | End: 2022-05-10 | Stop reason: HOSPADM

## 2022-05-08 RX ORDER — ACETAMINOPHEN 650 MG/1
650 SUPPOSITORY RECTAL
Status: DISCONTINUED | OUTPATIENT
Start: 2022-05-08 | End: 2022-05-10 | Stop reason: HOSPADM

## 2022-05-08 RX ORDER — MAGNESIUM SULFATE 100 %
4 CRYSTALS MISCELLANEOUS AS NEEDED
Status: DISCONTINUED | OUTPATIENT
Start: 2022-05-08 | End: 2022-05-08

## 2022-05-08 RX ORDER — INSULIN LISPRO 100 [IU]/ML
INJECTION, SOLUTION INTRAVENOUS; SUBCUTANEOUS
Status: DISCONTINUED | OUTPATIENT
Start: 2022-05-08 | End: 2022-05-10 | Stop reason: HOSPADM

## 2022-05-08 RX ORDER — ONDANSETRON 4 MG/1
4 TABLET, ORALLY DISINTEGRATING ORAL
Status: DISCONTINUED | OUTPATIENT
Start: 2022-05-08 | End: 2022-05-10 | Stop reason: HOSPADM

## 2022-05-08 RX ORDER — INSULIN GLARGINE 100 [IU]/ML
24 INJECTION, SOLUTION SUBCUTANEOUS DAILY
Status: DISCONTINUED | OUTPATIENT
Start: 2022-05-08 | End: 2022-05-10 | Stop reason: HOSPADM

## 2022-05-08 RX ORDER — AZITHROMYCIN 250 MG/1
500 TABLET, FILM COATED ORAL DAILY
Status: DISCONTINUED | OUTPATIENT
Start: 2022-05-08 | End: 2022-05-10 | Stop reason: HOSPADM

## 2022-05-08 RX ORDER — SODIUM CHLORIDE 0.9 % (FLUSH) 0.9 %
5-40 SYRINGE (ML) INJECTION EVERY 8 HOURS
Status: DISCONTINUED | OUTPATIENT
Start: 2022-05-08 | End: 2022-05-10 | Stop reason: HOSPADM

## 2022-05-08 RX ORDER — METFORMIN HYDROCHLORIDE 500 MG/1
500 TABLET ORAL 2 TIMES DAILY WITH MEALS
Status: DISCONTINUED | OUTPATIENT
Start: 2022-05-08 | End: 2022-05-10 | Stop reason: HOSPADM

## 2022-05-08 RX ORDER — POLYETHYLENE GLYCOL 3350 17 G/17G
17 POWDER, FOR SOLUTION ORAL DAILY PRN
Status: DISCONTINUED | OUTPATIENT
Start: 2022-05-08 | End: 2022-05-10 | Stop reason: HOSPADM

## 2022-05-08 RX ORDER — SODIUM CHLORIDE 0.9 % (FLUSH) 0.9 %
5-40 SYRINGE (ML) INJECTION AS NEEDED
Status: DISCONTINUED | OUTPATIENT
Start: 2022-05-08 | End: 2022-05-10 | Stop reason: HOSPADM

## 2022-05-08 RX ORDER — DEXTROSE MONOHYDRATE AND SODIUM CHLORIDE 5; .9 G/100ML; G/100ML
125 INJECTION, SOLUTION INTRAVENOUS CONTINUOUS
Status: DISCONTINUED | OUTPATIENT
Start: 2022-05-08 | End: 2022-05-08

## 2022-05-08 RX ORDER — POTASSIUM CHLORIDE 7.45 MG/ML
10 INJECTION INTRAVENOUS
Status: COMPLETED | OUTPATIENT
Start: 2022-05-08 | End: 2022-05-08

## 2022-05-08 RX ORDER — POTASSIUM CHLORIDE 750 MG/1
40 TABLET, FILM COATED, EXTENDED RELEASE ORAL
Status: COMPLETED | OUTPATIENT
Start: 2022-05-08 | End: 2022-05-08

## 2022-05-08 RX ORDER — IPRATROPIUM BROMIDE AND ALBUTEROL SULFATE 2.5; .5 MG/3ML; MG/3ML
3 SOLUTION RESPIRATORY (INHALATION)
Status: DISCONTINUED | OUTPATIENT
Start: 2022-05-08 | End: 2022-05-10 | Stop reason: HOSPADM

## 2022-05-08 RX ORDER — INSULIN LISPRO 100 [IU]/ML
INJECTION, SOLUTION INTRAVENOUS; SUBCUTANEOUS
Status: DISCONTINUED | OUTPATIENT
Start: 2022-05-08 | End: 2022-05-08 | Stop reason: SDUPTHER

## 2022-05-08 RX ORDER — INSULIN LISPRO 100 [IU]/ML
INJECTION, SOLUTION INTRAVENOUS; SUBCUTANEOUS
Status: DISCONTINUED | OUTPATIENT
Start: 2022-05-08 | End: 2022-05-08

## 2022-05-08 RX ORDER — POTASSIUM CHLORIDE, DEXTROSE MONOHYDRATE AND SODIUM CHLORIDE 300; 5; 900 MG/100ML; G/100ML; MG/100ML
INJECTION, SOLUTION INTRAVENOUS CONTINUOUS
Status: DISCONTINUED | OUTPATIENT
Start: 2022-05-08 | End: 2022-05-08

## 2022-05-08 RX ORDER — ENOXAPARIN SODIUM 100 MG/ML
40 INJECTION SUBCUTANEOUS DAILY
Status: DISCONTINUED | OUTPATIENT
Start: 2022-05-08 | End: 2022-05-10 | Stop reason: HOSPADM

## 2022-05-08 RX ORDER — ACETAMINOPHEN 325 MG/1
650 TABLET ORAL
Status: DISCONTINUED | OUTPATIENT
Start: 2022-05-08 | End: 2022-05-10 | Stop reason: HOSPADM

## 2022-05-08 RX ORDER — POTASSIUM CHLORIDE 7.45 MG/ML
10 INJECTION INTRAVENOUS
Status: DISCONTINUED | OUTPATIENT
Start: 2022-05-08 | End: 2022-05-08

## 2022-05-08 RX ORDER — ONDANSETRON 2 MG/ML
4 INJECTION INTRAMUSCULAR; INTRAVENOUS
Status: DISCONTINUED | OUTPATIENT
Start: 2022-05-08 | End: 2022-05-10 | Stop reason: HOSPADM

## 2022-05-08 RX ADMIN — METFORMIN HYDROCHLORIDE 500 MG: 500 TABLET ORAL at 16:25

## 2022-05-08 RX ADMIN — Medication 4.5 UNITS/HR: at 10:30

## 2022-05-08 RX ADMIN — POTASSIUM CHLORIDE, DEXTROSE MONOHYDRATE AND SODIUM CHLORIDE: 300; 5; 900 INJECTION, SOLUTION INTRAVENOUS at 09:36

## 2022-05-08 RX ADMIN — SODIUM CHLORIDE 1000 ML: 9 INJECTION, SOLUTION INTRAVENOUS at 01:00

## 2022-05-08 RX ADMIN — Medication 2.6 UNITS/HR: at 11:23

## 2022-05-08 RX ADMIN — CEFTRIAXONE 1 G: 1 INJECTION, POWDER, FOR SOLUTION INTRAMUSCULAR; INTRAVENOUS at 08:55

## 2022-05-08 RX ADMIN — SODIUM CHLORIDE 1000 ML: 9 INJECTION, SOLUTION INTRAVENOUS at 04:21

## 2022-05-08 RX ADMIN — INSULIN GLARGINE 24 UNITS: 100 INJECTION, SOLUTION SUBCUTANEOUS at 13:48

## 2022-05-08 RX ADMIN — Medication 5.9 UNITS/HR: at 12:39

## 2022-05-08 RX ADMIN — POTASSIUM CHLORIDE 10 MEQ: 7.46 INJECTION, SOLUTION INTRAVENOUS at 13:48

## 2022-05-08 RX ADMIN — POTASSIUM CHLORIDE 10 MEQ: 7.46 INJECTION, SOLUTION INTRAVENOUS at 11:03

## 2022-05-08 RX ADMIN — POTASSIUM CHLORIDE 40 MEQ: 750 TABLET, EXTENDED RELEASE ORAL at 09:33

## 2022-05-08 RX ADMIN — POTASSIUM CHLORIDE 10 MEQ: 7.46 INJECTION, SOLUTION INTRAVENOUS at 09:36

## 2022-05-08 RX ADMIN — DEXTROSE AND SODIUM CHLORIDE 125 ML/HR: 5; 900 INJECTION, SOLUTION INTRAVENOUS at 04:19

## 2022-05-08 RX ADMIN — Medication 2.4 UNITS/HR: at 09:31

## 2022-05-08 RX ADMIN — Medication 4.8 UNITS/HR: at 00:04

## 2022-05-08 RX ADMIN — Medication 3.8 UNITS/HR: at 13:53

## 2022-05-08 RX ADMIN — AZITHROMYCIN DIHYDRATE 500 MG: 250 TABLET, FILM COATED ORAL at 08:55

## 2022-05-08 RX ADMIN — ACETAMINOPHEN 650 MG: 325 TABLET ORAL at 16:25

## 2022-05-08 RX ADMIN — Medication 10 ML: at 21:16

## 2022-05-08 RX ADMIN — Medication 4.4 UNITS/HR: at 14:55

## 2022-05-08 RX ADMIN — INSULIN LISPRO 2 UNITS: 100 INJECTION, SOLUTION INTRAVENOUS; SUBCUTANEOUS at 16:25

## 2022-05-08 RX ADMIN — Medication 10 ML: at 13:58

## 2022-05-08 RX ADMIN — INSULIN LISPRO 10 UNITS: 100 INJECTION, SOLUTION INTRAVENOUS; SUBCUTANEOUS at 21:13

## 2022-05-08 RX ADMIN — Medication 10 ML: at 02:37

## 2022-05-08 NOTE — ED NOTES
Patient's , no continuous IV fluids ordered with dextrose and/or potassium. Dr. Chuy Lopes made aware. Awaiting orders.

## 2022-05-08 NOTE — PROGRESS NOTES
5/8/2022  11:54 AM  Case management note    Reason for Admission:  Diabetic keto acidosis  Patient came to ED for diagnosis of DM. Patient is independent and drives. Patient also history of asthma. [facility] @[facility]                       RUR Score:          low           Plan for utilizing home health:          PCP: First and Last name:  Ursula Castle MD     Name of Practice:    Are you a current patient: Yes/No: yes   Approximate date of last visit:    Can you participate in a virtual visit with your PCP:                     Current Advanced Directive/Advance Care Plan: Full Code NO AD      Healthcare Decision Maker:   Nellie Sow                              Transition of Care Plan:           1. Home with family assistance  2. PCP follow up  3. AD planning  4. CM to follow for discharge needs    Care Management Interventions  PCP Verified by CM:  Yes (Dr. Bailey Valadez)  Support Systems: Spouse/Significant Other  Confirm Follow Up Transport: Family  The Plan for Transition of Care is Related to the Following Treatment Goals : diabetic ketoacidosis  Discharge Location  Patient Expects to be Discharged to[de-identified] Home with family assistance   Genoveva Mata

## 2022-05-08 NOTE — PROGRESS NOTES
Admitted earlier this am    DKA: Continue Insulin drip however have to aggressively treat Hypokalemia. PO K dur and IV KCL ordered. Add K to D5  Check BMP in 4 hours. Stop Insulin drip when gap closes  Will add some Basal insulin at that time 2 hours before completely stopping drip . A1c is pending. She has been on Steroids for > 1 year for last 10 months on every other day prednisone for asthma. Hold Prednisone today due to DKA however needs to taper .

## 2022-05-08 NOTE — PROGRESS NOTES
Spiritual Care Assessment/Progress Note  [address]      NAME: Leeroy Buck      MRN: [id number]  AGE: 63 y.o. SEX: female  Mu-ism Affiliation: No Cheondoism   Language: English     5/8/2022     Total Time (in minutes): 7     Spiritual Assessment begun in [facility] INTENSIVE CARE through conversation with:         [x]Patient        [] Family    [] Friend(s)        Reason for Consult: Initial/Spiritual assessment, critical care     Spiritual beliefs: (Please include comment if needed)     [x] Identifies with a regina tradition:         [] Supported by a regina community:            [] Claims no spiritual orientation:           [] Seeking spiritual identity:                [] Adheres to an individual form of spirituality:           [] Not able to assess:                           Identified resources for coping:      [x] Prayer                               [] Music                  [] Guided Imagery     [x] Family/friends                 [] Pet visits     [] Devotional reading                         [] Unknown     [] Other:                                              Interventions offered during this visit: (See comments for more details)    Patient Interventions: Affirmation of emotions/emotional suffering,Catharsis/review of pertinent events in supportive environment,Initial/Spiritual assessment, Critical care,Prayer (assurance of)     Family/Friend(s):  Affirmation of emotions/emotional suffering,Catharsis/review of pertinent events in supportive environment,Initial Assessment,Prayer (assurance of)     Plan of Care:     [x] Support spiritual and/or cultural needs    [] Support AMD and/or advance care planning process      [] Support grieving process   [] Coordinate Rites and/or Rituals    [] Coordination with community clergy   [] No spiritual needs identified at this time   [] Detailed Plan of Care below (See Comments)  [] Make referral to Music Therapy  [] Make referral to Pet Therapy     [] Make referral to Addiction services  [] Make referral to [facility]  [] Make referral to Spiritual Care Partner  [] No future visits requested        [x] Contact Spiritual Care for further referrals     Comments: Visited Ms Danny Padilla in room 3008 for initial spiritual assessment; reviewed patient's chart prior to visit. Patient was lying quietly in bed; her facial affect was flat and she answered questions with one word replies. Patient's daughter was at her bedside and appeared in good spirits. Provided spiritual presence and active listening as patient shared that she was doing okay. She denied having any concerns to share. Patient's daughter also expressed no concerns. They accepted 's offer to keep them in prayer. Assured them of ongoing  availability for support. : . Severo Ambrose.  Melinda Torres; [facility], to contact [phone] [address] call: [phone] Is This A New Presentation, Or A Follow-Up?: Skin Lesions Which Family Member (Optional)?: Father

## 2022-05-08 NOTE — ED TRIAGE NOTES
Pt arrives from [facility] for abnormal EKG. Pt states \" she does not feel well\". Pt reports 10/10 chest pain with coughing and deep inhalation starting today.

## 2022-05-08 NOTE — ED NOTES
Report given to [facility] for continuity of care. Chief Complaint: Sent by [facility] for evaluation of abnormal EKG. Was taking steroids for same two weeks ago, but stopped because it spiked her blood sugar, so she stopped taking them. Some mid-sternal CP with coughing, generalized weakness, and fatigue. BG elevated during admission. Medical History: Asthma, hyperglycemia from steroids    Code Status: Full Code    Isolation: None    Diet: NPO    Medications Scheduled: Lovenox, insulin, fluids     IV Access: 20G RT AC    Monitor/Vital Signs: NSR (was ST) at 89 BPM, 148/85, and 97%    Oxygen: Room Air    Mobility: Ambulatory with standby assistance    Interventions: Labs, EKG, medications, serial labs, admission, monitoring.     Significant Results: CO2 14, , WBCs 12.1, ketones and glucose in urine    Outstanding Orders: Change IV fluids, third bolus, serial BG checks and chemistries    Consults: Hospitalist    Plan of Care: Admit for hyperglycemia with acidosis, nondiabetic    Family at Bedside: No

## 2022-05-08 NOTE — CONSULTS
Consult Date: 2022    Consults DKA    Subjective   61F with asthma and long term steroid therapy presented with hyperglycemia and generalized weakness. She received IVFs and was started in insulin gtt for presumed DKA. She was also noted to have hypophosphatemia. This morning she denied complaints of fever, chills, abdominal/pelvic pain, sob and would like to eat. Past Medical History:   Diagnosis Date    Asthma       History reviewed. No pertinent surgical history.   Family History   Problem Relation Age of Onset    Hypertension Mother       Social History     Tobacco Use    Smoking status: Former Smoker     Packs/day: 0.50     Years: 40.00     Pack years: 20.00     Quit date: 2018     Years since quittin.1    Smokeless tobacco: Never Used   Substance Use Topics    Alcohol use: No       Current Facility-Administered Medications   Medication Dose Route Frequency Provider Last Rate Last Admin    sodium chloride (NS) flush 5-40 mL  5-40 mL IntraVENous Q8H Luz Elena Shahid H, DO   10 mL at 22 0237    sodium chloride (NS) flush 5-40 mL  5-40 mL IntraVENous PRN Judye Kappa, DO        acetaminophen (TYLENOL) tablet 650 mg  650 mg Oral Q6H PRN Liberty Nery H, DO        Or    acetaminophen (TYLENOL) suppository 650 mg  650 mg Rectal Q6H PRN Liberty Nery H, DO        polyethylene glycol (MIRALAX) packet 17 g  17 g Oral DAILY PRN Liberty Nery H, DO        ondansetron (ZOFRAN ODT) tablet 4 mg  4 mg Oral Q8H PRN Liberty Nery H, DO        Or    [facility]) injection 4 mg  4 mg IntraVENous Q6H PRN Liberty Nery H, DO        enoxaparin (LOVENOX) injection 40 mg  40 mg SubCUTAneous DAILY Liberty Nery H, DO        insulin regular (NOVOLIN R, HUMULIN R) 100 Units in 0.9% sodium chloride 100 mL infusion  0-50 Units/hr IntraVENous TITRATE Liberty Nery H, DO 4.5 mL/hr at 22 1030 4.5 Units/hr at 22 1030    glucose chewable tablet 16 g  4 Tablet Oral PRN Judye Kappa, DO        dextrose 10% infusion 0-250 mL  0-250 mL IntraVENous PRN Chela Saliva H, DO        glucagon [facility]) injection 1 mg  1 mg IntraMUSCular PRN Chela Saliva H, DO        insulin lispro (HUMALOG) injection   SubCUTAneous TIDAC Chela Saliva H, DO        cefTRIAXone (ROCEPHIN) 1 g in 0.9% sodium chloride 10 mL IV syringe  1 g IntraVENous Q24H Chela Saliva H, DO   1 g at 05/08/22 1085    azithromycin (ZITHROMAX) tablet 500 mg  500 mg Oral DAILY Chela Saliva H, DO   500 mg at 05/08/22 0855    albuterol-ipratropium (DUO-NEB) 2.5 MG-0.5 MG/3 ML  3 mL Nebulization Q4H PRN Chela Saliva H, DO        dextrose 5% - 0.9% NaCl with KCl 40 mEq/L infusion   IntraVENous CONTINUOUS Mario Daugherty  mL/hr at 05/08/22 0936 New Bag at 05/08/22 0936    potassium chloride 10 mEq in 100 ml IVPB  10 mEq IntraVENous Q1H Mario Daugherty  mL/hr at 05/08/22 0936 10 mEq at 05/08/22 0936        Review of Systems   Constitutional: Positive for fatigue. Negative for chills, diaphoresis, fever and unexpected weight change. HENT: Negative for drooling and nosebleeds. Respiratory: Negative for cough, choking, chest tightness and shortness of breath. Gastrointestinal: Negative for abdominal distention, abdominal pain and blood in stool. Genitourinary: Negative for hematuria. Musculoskeletal: Negative for joint swelling. Neurological: Negative for seizures, light-headedness and numbness. Remainder of ros negative    Objective     Vital signs for last 24 hours:  Visit Vitals  /64   Pulse 86   Temp 98.1 °F (36.7 °C)   Resp 15   Ht 5' 4\" (1.626 m)   Wt 58.5 kg (129 lb)   LMP 03/26/2018   SpO2 97%   BMI 22.14 kg/m²       Intake/Output this shift:  Current Shift: 05/08 0701 - 05/08 1900  In: 664.9 [P.O.:200;  I.V.:464.9]  Out: -   Last 3 Shifts: 05/06 1901 - 05/08 0700  In: 3070.7 [P.O.:60; I.V.:3010.7]  Out: -     Data Review:   Recent Results (from the past 24 hour(s))   CBC WITH AUTOMATED DIFF    Collection Time: 05/07/22  9:10 PM   Result Value Ref Range    WBC 12.1 (H) 3.6 - 11.0 K/uL    RBC 5.01 3.80 - 5.20 M/uL    HGB 15.6 11.5 - 16.0 g/dL    HCT 45.3 35.0 - 47.0 %    MCV 90.4 80.0 - 99.0 FL    MCH 31.1 26.0 - 34.0 PG    MCHC 34.4 30.0 - 36.5 g/dL    RDW 13.3 11.5 - 14.5 %    PLATELET 246 378 - 880 K/uL    MPV 10.8 8.9 - 12.9 FL    NRBC 0.0 0  WBC    ABSOLUTE NRBC 0.00 0.00 - 0.01 K/uL    NEUTROPHILS 59 32 - 75 %    LYMPHOCYTES 33 12 - 49 %    MONOCYTES 4 (L) 5 - 13 %    EOSINOPHILS 0 0 - 7 %    BASOPHILS 1 0 - 1 %    METAMYELOCYTES 1 (H) 0 %    MYELOCYTES 2 (H) 0 %    IMMATURE GRANULOCYTES 0 %    ABS. NEUTROPHILS 7.1 1.8 - 8.0 K/UL    ABS. LYMPHOCYTES 4.0 (H) 0.8 - 3.5 K/UL    ABS. MONOCYTES 0.5 0.0 - 1.0 K/UL    ABS. EOSINOPHILS 0.0 0.0 - 0.4 K/UL    ABS. BASOPHILS 0.1 0.0 - 0.1 K/UL    ABS. IMM. GRANS. 0.0 K/UL    DF MANUAL      RBC COMMENTS NORMOCYTIC, NORMOCHROMIC     METABOLIC PANEL, COMPREHENSIVE    Collection Time: 05/07/22  9:10 PM   Result Value Ref Range    Sodium 131 (L) 136 - 145 mmol/L    Potassium 3.4 (L) 3.5 - 5.1 mmol/L    Chloride 98 97 - 108 mmol/L    CO2 13 (LL) 21 - 32 mmol/L    Anion gap 20 (H) 5 - 15 mmol/L    Glucose 386 (H) 65 - 100 mg/dL    BUN 10 6 - 20 MG/DL    Creatinine 0.93 0.55 - 1.02 MG/DL    BUN/Creatinine ratio 11 (L) 12 - 20      GFR est AA >60 >60 ml/min/1.73m2    GFR est non-AA >60 >60 ml/min/1.73m2    Calcium 10.2 (H) 8.5 - 10.1 MG/DL    Bilirubin, total 0.5 0.2 - 1.0 MG/DL    ALT (SGPT) 20 12 - 78 U/L    AST (SGOT) 9 (L) 15 - 37 U/L    Alk. phosphatase 129 (H) 45 - 117 U/L    Protein, total 7.8 6.4 - 8.2 g/dL    Albumin 3.5 3.5 - 5.0 g/dL    Globulin 4.3 (H) 2.0 - 4.0 g/dL    A-G Ratio 0.8 (L) 1.1 - 2.2     SAMPLES BEING HELD    Collection Time: 05/07/22  9:10 PM   Result Value Ref Range    SAMPLES BEING HELD SST     COMMENT        Add-on orders for these samples will be processed based on acceptable specimen integrity and analyte stability, which may vary by analyte.    D DIMER    Collection Time: 05/07/22  9:10 PM   Result Value Ref Range    D-dimer 0.57 0.00 - 0.65 mg/L FEU   TROPONIN-HIGH SENSITIVITY    Collection Time: 05/07/22  9:10 PM   Result Value Ref Range    Troponin-High Sensitivity 12 0 - 51 ng/L   LACTIC ACID    Collection Time: 05/07/22  9:59 PM   Result Value Ref Range    Lactic acid 0.9 0.4 - 2.0 MMOL/L   GLUCOSE, POC    Collection Time: 05/07/22 10:04 PM   Result Value Ref Range    Glucose (POC) 325 (H) 65 - 117 mg/dL    Performed by [facility] [place]    URINALYSIS W/MICROSCOPIC    Collection Time: 05/07/22 10:19 PM   Result Value Ref Range    Color YELLOW/STRAW      Appearance CLEAR CLEAR      Specific gravity 1.020 1.003 - 1.030      pH (UA) 5.0 5.0 - 8.0      Protein 30 (A) NEG mg/dL    Glucose >1,000 (A) NEG mg/dL    Ketone >80 (A) NEG mg/dL    Bilirubin Negative NEG      Blood TRACE (A) NEG      Urobilinogen 1.0 0.2 - 1.0 EU/dL    Nitrites Negative NEG      Leukocyte Esterase Negative NEG      WBC 0-4 0 - 4 /hpf    RBC 0-5 0 - 5 /hpf    Epithelial cells FEW FEW /lpf    Bacteria Negative NEG /hpf    Hyaline cast 5-10 0 - 2 /lpf   URINE CULTURE HOLD SAMPLE    Collection Time: 05/07/22 10:19 PM    Specimen: Serum; Urine   Result Value Ref Range    Urine culture hold        Urine on hold in Microbiology dept for 2 days. If unpreserved urine is submitted, it cannot be used for addtional testing after 24 hours, recollection will be required.    POC VENOUS BLOOD GAS    Collection Time: 05/07/22 10:28 PM   Result Value Ref Range    pH, venous (POC) 7.30 (L) 7.32 - 7.42      pCO2, venous (POC) 22.8 (L) 41 - 51 MMHG    pO2, venous (POC) 25 25 - 40 mmHg    HCO3, venous (POC) 11.2 (L) 23.0 - 28.0 MMOL/L    sO2, venous (POC) 40.4 (L) 65 - 88 %    Base deficit, venous (POC) 13.2 mmol/L    Specimen type (POC) VENOUS BLOOD      Performed by Eyal Tony    GLUCOSE, POC    Collection Time: 05/08/22 12:00 AM   Result Value Ref Range    Glucose (POC) 300 (H) 65 - 117 mg/dL Performed by Bishop Florence    Collection Time: 05/08/22 12:19 AM   Result Value Ref Range    Glucose 300 mg/dL    Insulin order 4.8 units/hour    Insulin adminstered 4.8 units/hour    Multiplier 0.020     Low target 150 mg/dL    High target 250 mg/dL    D50 order 0.0 ml    D50 administered 0.00 ml    Minutes until next BG 60 min    Order initials SME     Administered initials MERISSA ZHAO Comments     GLUCOSE, POC    Collection Time: 05/08/22  1:23 AM   Result Value Ref Range    Glucose (POC) 205 (H) 65 - 117 mg/dL    Performed by Bishop Florence    Collection Time: 05/08/22  1:24 AM   Result Value Ref Range    Glucose 205 mg/dL    Insulin order 2.9 units/hour    Insulin adminstered 2.9 units/hour    Multiplier 0.020     Low target 150 mg/dL    High target 250 mg/dL    D50 order 0.0 ml    D50 administered 0.00 ml    Minutes until next BG 60 min    Order initials SME     Administered initials MERISSA ZHAO Comments     GLUCOSE, POC    Collection Time: 05/08/22  2:30 AM   Result Value Ref Range    Glucose (POC) 124 (H) 65 - 117 mg/dL    Performed by Bishop Florence    Collection Time: 05/08/22  2:35 AM   Result Value Ref Range    Glucose 124 mg/dL    Insulin order 0.6 units/hour    Insulin adminstered 0.6 units/hour    Multiplier 0.010     Low target 150 mg/dL    High target 250 mg/dL    D50 order 0.0 ml    D50 administered 0.00 ml    Minutes until next BG 60 min    Order initials SME     Administered initials RUPAL Galvez     METABOLIC PANEL, BASIC    Collection Time: 05/08/22  2:49 AM   Result Value Ref Range    Sodium 137 136 - 145 mmol/L    Potassium 3.0 (L) 3.5 - 5.1 mmol/L    Chloride 107 97 - 108 mmol/L    CO2 15 (LL) 21 - 32 mmol/L    Anion gap 15 5 - 15 mmol/L    Glucose 142 (H) 65 - 100 mg/dL    BUN 9 6 - 20 MG/DL    Creatinine 0.62 0.55 - 1.02 MG/DL    BUN/Creatinine ratio 15 12 - 20      GFR est AA >60 >60 ml/min/1.73m2    GFR est non-AA >60 >60 ml/min/1.73m2    Calcium 9.5 8.5 - 10.1 MG/DL   MAGNESIUM    Collection Time: 05/08/22  2:49 AM   Result Value Ref Range    Magnesium 2.0 1.6 - 2.4 mg/dL   PHOSPHORUS    Collection Time: 05/08/22  2:49 AM   Result Value Ref Range    Phosphorus 1.5 (L) 2.6 - 4.7 MG/DL   HEMOGLOBIN A1C WITH EAG    Collection Time: 05/08/22  2:49 AM   Result Value Ref Range    Hemoglobin A1c 13.8 (H) 4.0 - 5.6 %    Est. average glucose 349 mg/dL   GLUCOSE, POC    Collection Time: 05/08/22  3:40 AM   Result Value Ref Range    Glucose (POC) 154 (H) 65 - 117 mg/dL    Performed by Vivian Lagunas    Collection Time: 05/08/22  3:45 AM   Result Value Ref Range    Glucose 154 mg/dL    Insulin order 0.9 units/hour    Insulin adminstered 0.9 units/hour    Multiplier 0.010     Low target 150 mg/dL    High target 250 mg/dL    D50 order 0.0 ml    D50 administered 0.00 ml    Minutes until next BG 60 min    Order initials sdh     Administered initials sdh     GLSCOM Comments     GLUCOSE, POC    Collection Time: 05/08/22  4:50 AM   Result Value Ref Range    Glucose (POC) 153 (H) 65 - 117 mg/dL    Performed by Chris Walters    Collection Time: 05/08/22  4:50 AM   Result Value Ref Range    Glucose 153 mg/dL    Insulin order 0.9 units/hour    Insulin adminstered 0.9 units/hour    Multiplier 0.010     Low target 150 mg/dL    High target 250 mg/dL    D50 order 0.0 ml    D50 administered 0.00 ml    Minutes until next BG 60 min    Order initials sdh     Administered initials sdh     GLSCOM Comments     GLUCOSE, POC    Collection Time: 05/08/22  5:55 AM   Result Value Ref Range    Glucose (POC) 536 (H) 65 - 117 mg/dL    Performed by Neris Caro    GLUCOSE, POC    Collection Time: 05/08/22  6:01 AM   Result Value Ref Range    Glucose (POC) 177 (H) 65 - 117 mg/dL    Performed by Chris Walters    Collection Time: 05/08/22  6:02 AM   Result Value Ref Range    Glucose 177 mg/dL    Insulin order 1.2 units/hour    Insulin adminstered 1.2 units/hour    Multiplier 0.010     Low target 150 mg/dL    High target 250 mg/dL    D50 order 0.0 ml    D50 administered 0.00 ml    Minutes until next BG 60 min    Order initials sdh     Administered initials sdh     GLSCOM Comments     GLUCOSE, POC    Collection Time: 05/08/22  7:03 AM   Result Value Ref Range    Glucose (POC) 206 (H) 65 - 117 mg/dL    Performed by Oumou Zee    Collection Time: 05/08/22  7:04 AM   Result Value Ref Range    Glucose 206 mg/dL    Insulin order 1.5 units/hour    Insulin adminstered 1.5 units/hour    Multiplier 0.010     Low target 150 mg/dL    High target 250 mg/dL    D50 order 0.0 ml    D50 administered 0.00 ml    Minutes until next BG 60 min    Order initials sdh     Administered initials sdh     GLSCOM Comments     METABOLIC PANEL, BASIC    Collection Time: 05/08/22  8:20 AM   Result Value Ref Range    Sodium 139 136 - 145 mmol/L    Potassium 2.7 (LL) 3.5 - 5.1 mmol/L    Chloride 111 (H) 97 - 108 mmol/L    CO2 15 (LL) 21 - 32 mmol/L    Anion gap 13 5 - 15 mmol/L    Glucose 182 (H) 65 - 100 mg/dL    BUN 7 6 - 20 MG/DL    Creatinine 0.59 0.55 - 1.02 MG/DL    BUN/Creatinine ratio 12 12 - 20      GFR est AA >60 >60 ml/min/1.73m2    GFR est non-AA >60 >60 ml/min/1.73m2    Calcium 8.5 8.5 - 10.1 MG/DL   GLUCOSE, POC    Collection Time: 05/08/22  8:21 AM   Result Value Ref Range    Glucose (POC) 186 (H) 65 - 117 mg/dL    Performed by Jefry Toure    Collection Time: 05/08/22  8:25 AM   Result Value Ref Range    Glucose 186 mg/dL    Insulin order 1.3 units/hour    Insulin adminstered 1.3 units/hour    Multiplier 0.010     Low target 150 mg/dL    High target 250 mg/dL    D50 order 0.0 ml    D50 administered 0.00 ml    Minutes until next BG 60 min    Order initials NE     Administered initials NE     GLSCOM Comments     GLUCOSE, POC    Collection Time: 05/08/22  9:29 AM   Result Value Ref Range    Glucose (POC) 302 (H) 65 - 117 mg/dL    Performed by [facility]    Collection Time: 05/08/22  9:30 AM   Result Value Ref Range    Glucose 302 mg/dL    Insulin order 2.4 units/hour    Insulin adminstered 2.4 units/hour    Multiplier 0.010     Low target 150 mg/dL    High target 250 mg/dL    D50 order 0.0 ml    D50 administered 0.00 ml    Minutes until next BG 60 min    Order initials NE     Administered initials NE     GLSCOM Comments     GLUCOSE, POC    Collection Time: 05/08/22 10:28 AM   Result Value Ref Range    Glucose (POC) 286 (H) 65 - 117 mg/dL    Performed by [facility]    Collection Time: 05/08/22 10:29 AM   Result Value Ref Range    Glucose 286 mg/dL    Insulin order 4.5 units/hour    Insulin adminstered 4.5 units/hour    Multiplier 0.020     Low target 150 mg/dL    High target 250 mg/dL    D50 order 0.0 ml    D50 administered 0.00 ml    Minutes until next BG 60 min    Order initials NE     Administered initials NE     GLSCOM Comments         Physical Exam    Exam facilitated by use of telemedicine platform and with assistance from in house team  Gen  Awake and interactive; no acute distress; full sentences  Head  nc/at  Eyes  anicteric sclera  Ent  normal external anatomy  Cvs  sinus rhythm without external evidence of hypoperfusion  Pulm  normal WOB and adequate SaO2 on RA  GI-no evidence of tenderness with passive movement; no bruising or ecchymosis  Ext  no c/c/e  Msk  normal bulk  Neuro  oriented, no deficits in strength noted; no tremor; follows commands      Problem List  DKA  DM  Asthma  Chronic Steroid Use    Neuro - pain controlled and CAM negative    cvs - acceptable MAPs and if has sufficient BP room and cr permits, may benefit from acei or ARB given SM    pulm - h/o asthma and has been on protracted steroid taper - would continue as such; no evidence of AE    Gi - no acute need for KARSON proph    gu - acceptable micturition and cr    Heme - should be oin dvt proph    Id - no compelling evidence of acute bacterial infection    Endo - a1c indicative of uncontrolled DM; improved AG and symptoms; aggressive electrolyte repletion needed; anticipate will need insulin and subsequenbt education/training    CCT 35 minutes excluding teaching and procedures    I performed all aspects of the physical examination via Telemedicine associated with two way audio and video communication and with the on-site assistance of the bedside nurse. I am located in [place] and the patient is located in [place] at [phone]   The patient is critically ill in the ICU. I  personally  reviewed the pertinent medical records, laboratory/ pathology data and radiographic images. The decision making regarding this patient is as documented above, which was generated  following  discussion  with the multidisciplinary ICU team and creation of a treatment plan for  the patient. We discussed the patient's interval history and future coordination of care and  plans. The patient's medications  were reviewed and changes made as stipulated above. Due to  critical illness impairing one or more vital organs of this patient resulting in life threatening clinical situation  I have provided direct, frequent personal  assessment and manipulation in management plan.

## 2022-05-08 NOTE — H&P
History and Physical  NAME: Parish Stevens  MRN: [id number]  YOB: 1960  AGE: 63 y.o.  female  SOCIAL SECURITY NUMBER: [id number]  Primary Care Provider: Xavier Murillo MD  CODE STATUS: Full Code      ASSESSMENT/PLAN:  1. Diabetic ketoacidosis with new onset diabetes mellitus. Patient has been placed on insulin drip per DKA protocol. A total of 3 L normal saline bolus has been ordered (1 L by emergency department physician, 2 L by hospitalist). When blood glucose came down to below 150, D5 normal saline at 125 cc/h was ordered, as patient continued to have a high anion gap, (although trending downward from 20 to 15) and remained on the insulin drip. Check hemoglobin A1c.  2.  Acute upper respiratory infection. [id number] mg p.o. daily X 3 doses. Ceftriaxone 1 g IV daily. Continue to monitor WBC. 3.  Asthma. There might be mild exacerbation. Continue to monitor. DuoNebs every 4 hours as needed wheezing. 4.  History of tobacco use. Patient may have underlying COPD. History of Presenting Illness:   Parish Stevens is a 63 y.o. female who presents with generalized weakness X 4 days. During the same time, she has noticed dry mouth and thirst, requiring her to drink fluids frequently. Patient denies any nausea, vomiting, abdominal pain or pain elsewhere. She denies any dysuria. Patient states that she has had some nasal congestion and a cough productive with yellowish phlegm over the last 4 days. She denies any consistent history of seasonal allergies. She does have a history of asthma. Patient denies wheezing. She denies chronic cough. She states that she stopped smoking about a year ago after 40 years. Patient denies subjective fever, chills. Patient states that she has never been diagnosed with diabetes mellitus.        Review of Systems:  A comprehensive review of systems was negative except for that written in the History of Present Illness. Past Medical History:   Diagnosis Date    Asthma         PAST SURGICAL HISTORY: No past surgeries. Prior to Admission medications    Medication Sig Start Date End Date Taking? Authorizing Provider   montelukast (SINGULAIR) 10 mg tablet Take 10 mg by mouth daily. 3/10/22   Provider, Historical   diclofenac EC (VOLTAREN) 75 mg EC tablet Take 1 Tablet by mouth two (2) times a day. 3/24/22   [facility],    albuterol (PROVENTIL VENTOLIN) 2.5 mg /3 mL (0.083 %) nebulizer solution 3 mL by Nebulization route every four (4) hours as needed for Wheezing. 18   Melinda Pino MD       Allergies   Allergen Reactions    Sulfa (Sulfonamide Antibiotics) Angioedema        Family History   Problem Relation Age of Onset    Hypertension Mother         Social History     Tobacco Use    Smoking status: Former Smoker     Packs/day: 0.50     Years: 40.00     Pack years: 20.00     Quit date: 2018     Years since quittin.1    Smokeless tobacco: Never Used   Vaping Use    Vaping Use: Never used   Substance Use Topics    Alcohol use: No    Drug use: No       Physical exam  Patient Vitals for the past 24 hrs:   BP Temp Pulse Resp SpO2  oxygen therapy   22 0400 (!) 131/57 98.3 °F (36.8 °C) 93 17 96 %  room air   22 0345 128/64  95 18 99 %  room air   22 0315   91 14 94 % room air   22 0300   93 14 95 % room air   22 0245 (!) 148/85  89 13 97 % room air   22 0204   92 23 97 % room air   22 2241   (!) 101      22 2106 (!) 165/93 98.5 °F (36.9 °C) (!) 109 16 100 % room air     Estimated body mass index is 22.14 kg/m² as calculated from the following:    Height as of this encounter: 5' 4\" (1.626 m). Weight as of this encounter: 58.5 kg (129 lb). General: In no acute distress. Well developed, well nourished. Head: Normocephalic, atraumatic. Eyes: Anicteric sclera. PERRL.   Extraocular muscles intact. ENT: External ears and nose appear normal.  Oral mucosa moist.  Neck: Supple. No jugular venous distention. Heart: Regular rate and rhythm. No murmurs appreciated. Chest: Symmetrical excursion. Clear to auscultation bilaterally. Abdomen: Soft, nontender. No abnormal distention. Bowel sounds are present throughout. Extremities: No gross deformities. No edema, no cyanosis. Feet are warm to touch. Neurological: No lateralizing deficits. Alert, oriented X3. Skin: No jaundice. No rashes. Recent Results (from the past 24 hour(s))   CBC WITH AUTOMATED DIFF    Collection Time: 05/07/22  9:10 PM   Result Value Ref Range    WBC 12.1 (H) 3.6 - 11.0 K/uL    RBC 5.01 3.80 - 5.20 M/uL    HGB 15.6 11.5 - 16.0 g/dL    HCT 45.3 35.0 - 47.0 %    MCV 90.4 80.0 - 99.0 FL    MCH 31.1 26.0 - 34.0 PG    MCHC 34.4 30.0 - 36.5 g/dL    RDW 13.3 11.5 - 14.5 %    PLATELET 965 897 - 024 K/uL    MPV 10.8 8.9 - 12.9 FL    NRBC 0.0 0  WBC    ABSOLUTE NRBC 0.00 0.00 - 0.01 K/uL    NEUTROPHILS 59 32 - 75 %    LYMPHOCYTES 33 12 - 49 %    MONOCYTES 4 (L) 5 - 13 %    EOSINOPHILS 0 0 - 7 %    BASOPHILS 1 0 - 1 %    METAMYELOCYTES 1 (H) 0 %    MYELOCYTES 2 (H) 0 %    IMMATURE GRANULOCYTES 0 %    ABS. NEUTROPHILS 7.1 1.8 - 8.0 K/UL    ABS. LYMPHOCYTES 4.0 (H) 0.8 - 3.5 K/UL    ABS. MONOCYTES 0.5 0.0 - 1.0 K/UL    ABS. EOSINOPHILS 0.0 0.0 - 0.4 K/UL    ABS. BASOPHILS 0.1 0.0 - 0.1 K/UL    ABS. IMM.  GRANS. 0.0 K/UL    DF MANUAL      RBC COMMENTS NORMOCYTIC, NORMOCHROMIC     METABOLIC PANEL, COMPREHENSIVE    Collection Time: 05/07/22  9:10 PM   Result Value Ref Range    Sodium 131 (L) 136 - 145 mmol/L    Potassium 3.4 (L) 3.5 - 5.1 mmol/L    Chloride 98 97 - 108 mmol/L    CO2 13 (LL) 21 - 32 mmol/L    Anion gap 20 (H) 5 - 15 mmol/L    Glucose 386 (H) 65 - 100 mg/dL    BUN 10 6 - 20 MG/DL    Creatinine 0.93 0.55 - 1.02 MG/DL    BUN/Creatinine ratio 11 (L) 12 - 20      GFR est AA >60 >60 ml/min/1.73m2    GFR est non-AA >60 >60 ml/min/1.73m2    Calcium 10.2 (H) 8.5 - 10.1 MG/DL    Bilirubin, total 0.5 0.2 - 1.0 MG/DL    ALT (SGPT) 20 12 - 78 U/L    AST (SGOT) 9 (L) 15 - 37 U/L    Alk. phosphatase 129 (H) 45 - 117 U/L    Protein, total 7.8 6.4 - 8.2 g/dL    Albumin 3.5 3.5 - 5.0 g/dL    Globulin 4.3 (H) 2.0 - 4.0 g/dL    A-G Ratio 0.8 (L) 1.1 - 2.2     SAMPLES BEING HELD    Collection Time: 05/07/22  9:10 PM   Result Value Ref Range    SAMPLES BEING HELD SST     COMMENT        Add-on orders for these samples will be processed based on acceptable specimen integrity and analyte stability, which may vary by analyte. D DIMER    Collection Time: 05/07/22  9:10 PM   Result Value Ref Range    D-dimer 0.57 0.00 - 0.65 mg/L FEU   TROPONIN-HIGH SENSITIVITY    Collection Time: 05/07/22  9:10 PM   Result Value Ref Range    Troponin-High Sensitivity 12 0 - 51 ng/L   LACTIC ACID    Collection Time: 05/07/22  9:59 PM   Result Value Ref Range    Lactic acid 0.9 0.4 - 2.0 MMOL/L   GLUCOSE, POC    Collection Time: 05/07/22 10:04 PM   Result Value Ref Range    Glucose (POC) 325 (H) 65 - 117 mg/dL    Performed by Lyly BABB    URINALYSIS W/MICROSCOPIC    Collection Time: 05/07/22 10:19 PM   Result Value Ref Range    Color YELLOW/STRAW      Appearance CLEAR CLEAR      Specific gravity 1.020 1.003 - 1.030      pH (UA) 5.0 5.0 - 8.0      Protein 30 (A) NEG mg/dL    Glucose >1,000 (A) NEG mg/dL    Ketone >80 (A) NEG mg/dL    Bilirubin Negative NEG      Blood TRACE (A) NEG      Urobilinogen 1.0 0.2 - 1.0 EU/dL    Nitrites Negative NEG      Leukocyte Esterase Negative NEG      WBC 0-4 0 - 4 /hpf    RBC 0-5 0 - 5 /hpf    Epithelial cells FEW FEW /lpf    Bacteria Negative NEG /hpf    Hyaline cast 5-10 0 - 2 /lpf   URINE CULTURE HOLD SAMPLE    Collection Time: 05/07/22 10:19 PM    Specimen: Serum; Urine   Result Value Ref Range    Urine culture hold        Urine on hold in Microbiology dept for 2 days.   If unpreserved urine is submitted, it cannot be used for addtional testing after 24 hours, recollection will be required.    POC VENOUS BLOOD GAS    Collection Time: 05/07/22 10:28 PM   Result Value Ref Range    pH, venous (POC) 7.30 (L) 7.32 - 7.42      pCO2, venous (POC) 22.8 (L) 41 - 51 MMHG    pO2, venous (POC) 25 25 - 40 mmHg    HCO3, venous (POC) 11.2 (L) 23.0 - 28.0 MMOL/L    sO2, venous (POC) 40.4 (L) 65 - 88 %    Base deficit, venous (POC) 13.2 mmol/L    Specimen type (POC) VENOUS BLOOD      Performed by Tina Fleming    GLUCOSE, POC    Collection Time: 05/08/22 12:00 AM   Result Value Ref Range    Glucose (POC) 300 (H) 65 - 117 mg/dL    Performed by Nirmala Marie    Collection Time: 05/08/22 12:19 AM   Result Value Ref Range    Glucose 300 mg/dL    Insulin order 4.8 units/hour    Insulin adminstered 4.8 units/hour    Multiplier 0.020     Low target 150 mg/dL    High target 250 mg/dL    D50 order 0.0 ml    D50 administered 0.00 ml    Minutes until next BG 60 min    Order initials SME     Administered initials SJF     GLSCOM Comments     GLUCOSE, POC    Collection Time: 05/08/22  1:23 AM   Result Value Ref Range    Glucose (POC) 205 (H) 65 - 117 mg/dL    Performed by Nirmala Marie    Collection Time: 05/08/22  1:24 AM   Result Value Ref Range    Glucose 205 mg/dL    Insulin order 2.9 units/hour    Insulin adminstered 2.9 units/hour    Multiplier 0.020     Low target 150 mg/dL    High target 250 mg/dL    D50 order 0.0 ml    D50 administered 0.00 ml    Minutes until next BG 60 min    Order initials SME     Administered initials SJF     GLSCOM Comments     GLUCOSE, POC    Collection Time: 05/08/22  2:30 AM   Result Value Ref Range    Glucose (POC) 124 (H) 65 - 117 mg/dL    Performed by Nirmala Marie    Collection Time: 05/08/22  2:35 AM   Result Value Ref Range    Glucose 124 mg/dL    Insulin order 0.6 units/hour    Insulin adminstered 0.6 units/hour    Multiplier 0.010     Low target 150 mg/dL    High target 250 mg/dL    D50 order 0.0 ml    D50 administered 0.00 ml    Minutes until next BG 60 min    Order initials SME     Administered initials JK     GLSCOM Comments     METABOLIC PANEL, BASIC    Collection Time: 05/08/22  2:49 AM   Result Value Ref Range    Sodium 137 136 - 145 mmol/L    Potassium 3.0 (L) 3.5 - 5.1 mmol/L    Chloride 107 97 - 108 mmol/L    CO2 15 (LL) 21 - 32 mmol/L    Anion gap 15 5 - 15 mmol/L    Glucose 142 (H) 65 - 100 mg/dL    BUN 9 6 - 20 MG/DL    Creatinine 0.62 0.55 - 1.02 MG/DL    BUN/Creatinine ratio 15 12 - 20      GFR est AA >60 >60 ml/min/1.73m2    GFR est non-AA >60 >60 ml/min/1.73m2    Calcium 9.5 8.5 - 10.1 MG/DL   MAGNESIUM    Collection Time: 05/08/22  2:49 AM   Result Value Ref Range    Magnesium 2.0 1.6 - 2.4 mg/dL   PHOSPHORUS    Collection Time: 05/08/22  2:49 AM   Result Value Ref Range    Phosphorus 1.5 (L) 2.6 - 4.7 MG/DL   GLUCOSE, POC    Collection Time: 05/08/22  3:40 AM   Result Value Ref Range    Glucose (POC) 154 (H) 65 - 117 mg/dL    Performed by Vivian Fishman    Collection Time: 05/08/22  3:45 AM   Result Value Ref Range    Glucose 154 mg/dL    Insulin order 0.9 units/hour    Insulin adminstered 0.9 units/hour    Multiplier 0.010     Low target 150 mg/dL    High target 250 mg/dL    D50 order 0.0 ml    D50 administered 0.00 ml    Minutes until next BG 60 min    Order initials sdh     Administered initials sdh     GLSCOM Comments     GLUCOSE, POC    Collection Time: 05/08/22  4:50 AM   Result Value Ref Range    Glucose (POC) 153 (H) 65 - 117 mg/dL    Performed by Andres Tabares    Collection Time: 05/08/22  4:50 AM   Result Value Ref Range    Glucose 153 mg/dL    Insulin order 0.9 units/hour    Insulin adminstered 0.9 units/hour    Multiplier 0.010     Low target 150 mg/dL    High target 250 mg/dL    D50 order 0.0 ml    D50 administered 0.00 ml    Minutes until next BG 60 min    Order initials sdh     Administered initials sdh GLSCOM Comments     GLUCOSE, POC    Collection Time: 05/08/22  5:55 AM   Result Value Ref Range    Glucose (POC) 536 (H) 65 - 117 mg/dL    Performed by Nat Burks    GLUCOSE, POC    Collection Time: 05/08/22  6:01 AM   Result Value Ref Range    Glucose (POC) 177 (H) 65 - 117 mg/dL    Performed by Jemima Jeffers    Collection Time: 05/08/22  6:02 AM   Result Value Ref Range    Glucose 177 mg/dL    Insulin order 1.2 units/hour    Insulin adminstered 1.2 units/hour    Multiplier 0.010     Low target 150 mg/dL    High target 250 mg/dL    D50 order 0.0 ml    D50 administered 0.00 ml    Minutes until next BG 60 min    Order initials sdh     Administered initials sdh     GLSCOM Comments     GLUCOSE, POC    Collection Time: 05/08/22  7:03 AM   Result Value Ref Range    Glucose (POC) 206 (H) 65 - 117 mg/dL    Performed by Jemima Jeffers    Collection Time: 05/08/22  7:04 AM   Result Value Ref Range    Glucose 206 mg/dL    Insulin order 1.5 units/hour    Insulin adminstered 1.5 units/hour    Multiplier 0.010     Low target 150 mg/dL    High target 250 mg/dL    D50 order 0.0 ml    D50 administered 0.00 ml    Minutes until next BG 60 min    Order initials sdh     Administered initials sdh     GLSCOM Comments         XR SHOULDER LT AP/LAT MIN 2 V  X-rays of the left shoulder 3 views done today show evidence of normal   glenohumeral joint space. Slight cortical deformity is noted at the   inferior glenoid. Type II acromion.

## 2022-05-08 NOTE — ED PROVIDER NOTES
Reyes Meiers is a 64 yo F with history of Asthma who presents to the ED with fatigue, chest pressure, cough and shortness of breath. She was seen by her pulmonologist on Monday and prescribed a z pack which she has completed but does not feel any better. She has been coughing up yellow sputum and has felt very tired with pressure in her chest.  She went to [facility] and they obtained a CXR and then referred the patient to the ED. Patient also states that her pulmonologist had prescribed her steroids 2 weeks ago but her blood sugar went really high so she took her off of them and she has not taken any in over a week. Past Medical History:   Diagnosis Date    Asthma        No past surgical history on file. No family history on file. Social History     Socioeconomic History    Marital status: SINGLE     Spouse name: Not on file    Number of children: Not on file    Years of education: Not on file    Highest education level: Not on file   Occupational History    Not on file   Tobacco Use    Smoking status: Former Smoker     Quit date: 2018     Years since quittin.1    Smokeless tobacco: Never Used   Vaping Use    Vaping Use: Never used   Substance and Sexual Activity    Alcohol use: No    Drug use: No    Sexual activity: Not on file   Other Topics Concern    Not on file   Social History Narrative    Not on file     Social Determinants of Health     Financial Resource Strain:     Difficulty of Paying Living Expenses: Not on file   Food Insecurity:     Worried About [address] in the Last Year: Not on file    [address] in the Last Year: Not on file   Transportation Needs:     Lack of Transportation (Medical): Not on file    Lack of Transportation (Non-Medical):  Not on file   Physical Activity:     Days of Exercise per Week: Not on file    Minutes of Exercise per Session: Not on file   Stress:     Feeling of Stress : Not on file Social Connections:     Frequency of Communication with Friends and Family: Not on file    Frequency of Social Gatherings with Friends and Family: Not on file    Attends Jain Services: Not on file    Active Member of Clubs or Organizations: Not on file    Attends Club or Organization Meetings: Not on file    Marital Status: Not on file   Intimate Partner Violence:     Fear of Current or Ex-Partner: Not on file    Emotionally Abused: Not on file    Physically Abused: Not on file    Sexually Abused: Not on file   Housing Stability:     Unable to Pay for Housing in the Last Year: Not on file    Number of [place] in the Last Year: Not on file    Unstable Housing in the Last Year: Not on file         ALLERGIES: Sulfa (sulfonamide antibiotics)    Review of Systems   Constitutional: Positive for fatigue. Negative for fever. HENT: Negative for sore throat. Eyes: Negative for visual disturbance. Respiratory: Positive for cough and chest tightness. Cardiovascular: Positive for chest pain. Gastrointestinal: Negative for abdominal pain. Genitourinary: Negative for dysuria. Musculoskeletal: Negative for back pain. Skin: Negative for rash. Neurological: Negative for headaches. Vitals:    05/07/22 2106   BP: (!) 165/93   Pulse: (!) 109   Resp: 16   Temp: 98.5 °F (36.9 °C)   SpO2: 100%   Weight: 58.5 kg (129 lb)   Height: 5' 4\" (1.626 m)            Physical Exam  Vitals and nursing note reviewed. Constitutional:       General: She is not in acute distress. Appearance: She is well-developed. HENT:      Head: Normocephalic and atraumatic. Eyes:      Conjunctiva/sclera: Conjunctivae normal.   Neck:      Trachea: Phonation normal.   Cardiovascular:      Rate and Rhythm: Tachycardia present. Pulmonary:      Effort: Pulmonary effort is normal. No respiratory distress. Breath sounds: No wheezing, rhonchi or rales. Abdominal:      General: There is no distension. Musculoskeletal:         General: No tenderness. Normal range of motion. Cervical back: Normal range of motion. Skin:     General: Skin is warm and dry. Neurological:      Mental Status: She is alert. She is not disoriented. Motor: No abnormal muscle tone. ED EKG interpretation:  Rhythm: sinus tachycardia; and regular . Rate (approx.): 107; Axis: normal; P wave: normal; QRS interval: normal ; ST/T wave: non-specific changes; Other findings: abnormal ekg. This EKG was interpreted by Dwayne Jim MD,ED Provider. MDM         9:39 PM  Reviewed CXR images from [facility]. Lungs hyperinflated but no pneumonia or effusion noted. 9:45 PM  CMP reviewed and significant for CO2 13. Glucose 386. Lactic acid, VBG and UA ordered for ketones. Patient denies h/o diabetes but does note that 2 weeks ago was on steroids and her physician discontinued them due to hyperglycemia. 10:15 PM  Change of shift. Care of patient signed over to Dr. Emeli Muñoz. Bedside handoff complete. Awaiting VBG, Lactic acid UA, anticipate admission.    Procedures

## 2022-05-08 NOTE — PROGRESS NOTES
0730-bedside report received from off going RN [facility] and care resumed for abelino ALVAREZ completed and whiteboard updatd,introductions made, pt appears to be resting in bed at this time, denies pain, night shift nurse assisted pt to bedside commode, this nurse also signed off on insulin drip with off going RN, LILLIANA, will continue to monitor and act accordingly   1500-pt refusing LANTUS, requesting PILL form, this nurse gave extensive education on the importance of taking medication, pt appears to be accepting of information, family at bedside, will monitor  1900-bedside report given to oncoming RN, SBAR report given, all questions answered

## 2022-05-09 VITALS
HEIGHT: 64 IN | WEIGHT: 129 LBS | BODY MASS INDEX: 22.02 KG/M2 | DIASTOLIC BLOOD PRESSURE: 70 MMHG | RESPIRATION RATE: 20 BRPM | SYSTOLIC BLOOD PRESSURE: 126 MMHG | OXYGEN SATURATION: 100 % | TEMPERATURE: 97.8 F | HEART RATE: 99 BPM

## 2022-05-09 LAB
ALBUMIN SERPL-MCNC: 2.4 G/DL (ref 3.5–5)
ALBUMIN/GLOB SERPL: 0.8 {RATIO} (ref 1.1–2.2)
ALP SERPL-CCNC: 99 U/L (ref 45–117)
ALT SERPL-CCNC: 16 U/L (ref 12–78)
ANION GAP SERPL CALC-SCNC: 10 MMOL/L (ref 5–15)
AST SERPL-CCNC: 10 U/L (ref 15–37)
BILIRUB SERPL-MCNC: 0.2 MG/DL (ref 0.2–1)
BUN SERPL-MCNC: 6 MG/DL (ref 6–20)
BUN/CREAT SERPL: 10 (ref 12–20)
CALCIUM SERPL-MCNC: 8.7 MG/DL (ref 8.5–10.1)
CHLORIDE SERPL-SCNC: 113 MMOL/L (ref 97–108)
CO2 SERPL-SCNC: 17 MMOL/L (ref 21–32)
CREAT SERPL-MCNC: 0.59 MG/DL (ref 0.55–1.02)
ERYTHROCYTE [DISTWIDTH] IN BLOOD BY AUTOMATED COUNT: 13.4 % (ref 11.5–14.5)
GLOBULIN SER CALC-MCNC: 2.9 G/DL (ref 2–4)
GLUCOSE BLD STRIP.AUTO-MCNC: 139 MG/DL (ref 65–117)
GLUCOSE BLD STRIP.AUTO-MCNC: 140 MG/DL (ref 65–117)
GLUCOSE BLD STRIP.AUTO-MCNC: 153 MG/DL (ref 65–117)
GLUCOSE BLD STRIP.AUTO-MCNC: 179 MG/DL (ref 65–117)
GLUCOSE BLD STRIP.AUTO-MCNC: 200 MG/DL (ref 65–117)
GLUCOSE BLD STRIP.AUTO-MCNC: 84 MG/DL (ref 65–117)
GLUCOSE SERPL-MCNC: 76 MG/DL (ref 65–100)
HCT VFR BLD AUTO: 36.1 % (ref 35–47)
HGB BLD-MCNC: 12.2 G/DL (ref 11.5–16)
MCH RBC QN AUTO: 30.6 PG (ref 26–34)
MCHC RBC AUTO-ENTMCNC: 33.8 G/DL (ref 30–36.5)
MCV RBC AUTO: 90.5 FL (ref 80–99)
NRBC # BLD: 0 K/UL (ref 0–0.01)
NRBC BLD-RTO: 0 PER 100 WBC
PLATELET # BLD AUTO: 218 K/UL (ref 150–400)
PMV BLD AUTO: 11 FL (ref 8.9–12.9)
POTASSIUM SERPL-SCNC: 3.4 MMOL/L (ref 3.5–5.1)
PROT SERPL-MCNC: 5.3 G/DL (ref 6.4–8.2)
RBC # BLD AUTO: 3.99 M/UL (ref 3.8–5.2)
SERVICE CMNT-IMP: ABNORMAL
SERVICE CMNT-IMP: NORMAL
SODIUM SERPL-SCNC: 140 MMOL/L (ref 136–145)
WBC # BLD AUTO: 11.1 K/UL (ref 3.6–11)

## 2022-05-09 PROCEDURE — 74011250637 HC RX REV CODE- 250/637: Performed by: INTERNAL MEDICINE

## 2022-05-09 PROCEDURE — 74011000250 HC RX REV CODE- 250: Performed by: INTERNAL MEDICINE

## 2022-05-09 PROCEDURE — 82962 GLUCOSE BLOOD TEST: CPT

## 2022-05-09 PROCEDURE — 36415 COLL VENOUS BLD VENIPUNCTURE: CPT

## 2022-05-09 PROCEDURE — 74011250636 HC RX REV CODE- 250/636: Performed by: INTERNAL MEDICINE

## 2022-05-09 PROCEDURE — 80053 COMPREHEN METABOLIC PANEL: CPT

## 2022-05-09 PROCEDURE — 74011250637 HC RX REV CODE- 250/637: Performed by: HOSPITALIST

## 2022-05-09 PROCEDURE — 74011636637 HC RX REV CODE- 636/637: Performed by: HOSPITALIST

## 2022-05-09 PROCEDURE — 85027 COMPLETE CBC AUTOMATED: CPT

## 2022-05-09 RX ADMIN — INSULIN LISPRO 3 UNITS: 100 INJECTION, SOLUTION INTRAVENOUS; SUBCUTANEOUS at 08:10

## 2022-05-09 RX ADMIN — AZITHROMYCIN DIHYDRATE 500 MG: 250 TABLET, FILM COATED ORAL at 08:10

## 2022-05-09 RX ADMIN — ONDANSETRON 4 MG: 2 INJECTION INTRAMUSCULAR; INTRAVENOUS at 11:53

## 2022-05-09 RX ADMIN — INSULIN GLARGINE 24 UNITS: 100 INJECTION, SOLUTION SUBCUTANEOUS at 08:10

## 2022-05-09 RX ADMIN — Medication 10 ML: at 17:07

## 2022-05-09 RX ADMIN — METFORMIN HYDROCHLORIDE 500 MG: 500 TABLET ORAL at 17:16

## 2022-05-09 RX ADMIN — INSULIN LISPRO 2 UNITS: 100 INJECTION, SOLUTION INTRAVENOUS; SUBCUTANEOUS at 17:15

## 2022-05-09 RX ADMIN — CEFTRIAXONE 1 G: 1 INJECTION, POWDER, FOR SOLUTION INTRAMUSCULAR; INTRAVENOUS at 08:11

## 2022-05-09 RX ADMIN — Medication 10 ML: at 06:01

## 2022-05-09 RX ADMIN — METFORMIN HYDROCHLORIDE 500 MG: 500 TABLET ORAL at 08:10

## 2022-05-09 NOTE — DISCHARGE INSTR - DIET
Good nutrition is important when healing from an illness, injury, or surgery. Follow any nutrition recommendations given to you during your hospital stay. If you were given an oral nutrition supplement while in the hospital, continue to take this supplement at home. You can take it with meals, in-between meals, and/or before bedtime. These supplements can be purchased at most local grocery stores, pharmacies, and chain [employer]-stores. If you have any questions about your diet or nutrition, call the hospital and ask for the dietitian. Thank you for visiting with the Registered Dietitian today.   John Richardson , Romel Spain

## 2022-05-09 NOTE — PROGRESS NOTES
1900-Bedside and Verbal shift change report given to Lazarus Rancher (oncoming nurse) by Alejandro Moreno (offgoing nurse). Report included the following information SBAR, Kardex, Intake/Output, MAR, Recent Results, Cardiac Rhythm nsr, Alarm Parameters  and Quality Measures   See MAR for all medication administrations. See flowsheets for all assessments. 2057-  notified of pt . Orders to adminster 10 units regular insulin. 0700-Bedside and Verbal shift change report given to Toby (oncoming nurse) by Lazarus Rancher (offgoing nurse). Report included the following information SBAR, Kardex, Intake/Output, MAR, Recent Results, Cardiac Rhythm nsr, Alarm Parameters  and Quality Measures.

## 2022-05-09 NOTE — PROGRESS NOTES
Nutrition Note    · Verbally reviewed information with Patient  · Educated on diabetic diet - CM and patient daughter requested  · Written educational materials provided.     Electronically signed by Maura Cedeño RD on 1/9/7476   Contact: Ext: [phone], or via [employer]

## 2022-05-09 NOTE — PROGRESS NOTES
Transition of care note:    RUR 7%(low readmission risk score)    Prior to admission:    Pt is independent and drives. Pt has an outpatient  ,Polina [facility] @ [phone]. Pt has orders to transfer to medical floor. I met with pt and her daughter   Pt is requesting to meet with the nutritionist.  I contacted the nutritionist.    Daughter has been trying to establish her mother with an outpatient endocrinologist but has not received any return calls yet. Endocrinology has been consulted. When discharged,daughter will transport pt home.     Lea Campbell

## 2022-05-09 NOTE — PROGRESS NOTES
Michael Maldonado margot [place] 81  [address] [facility], [address] [place] [address]  [phone]      Medical Progress Note      NAME: Donnie Clements   :  1960  MRM:  [id number]    Date/Time of service: 2022  2:24 PM       Subjective:     Chief Complaint:  Patient was personally seen and examined by me during this time period. Chart reviewed. She is in bed with no acute distress does not seem to be having any fevers no chills no nausea or vomiting, relatively comfortable otherwise cough patient previously has has also improved,       Objective:       Vitals:       Last 24hrs VS reviewed since prior progress note.  Most recent are:    Visit Vitals  /60 (BP 1 Location: Left upper arm, BP Patient Position: At rest;Lying)   Pulse (!) 102   Temp 99.1 °F (37.3 °C)   Resp 14   Ht 5' 4\" (1.626 m)   Wt 58.5 kg (129 lb)   SpO2 98%   BMI 22.14 kg/m²     SpO2 Readings from Last 6 Encounters:   22 98%   18 96%            Intake/Output Summary (Last 24 hours) at 2022 1424  Last data filed at 2022 1121  Gross per 24 hour   Intake 1026.19 ml   Output    Net 1026.19 ml        Exam:     Physical Exam:    Gen:  Well-developed, well-nourished, in no acute distress  HEENT:  Pink conjunctivae, PERRL, hearing intact to voice, moist mucous membranes  Neck:  Supple, without masses, thyroid non-tender  Resp:  No accessory muscle use, clear breath sounds without wheezes rales or rhonchi  Card:  No murmurs, normal S1, S2 without thrills, bruits or peripheral edema  Abd:  Soft, non-tender, non-distended, normoactive bowel sounds are present,   Musc:  No cyanosis or clubbing  Skin:  No rashes or ulcers, skin turgor is good  Neuro:  Cranial nerves 3-12 are grossly intact,  Psych:  Good insight, oriented to person, place and time, alert      Medications Reviewed: (see below)    Lab Data Reviewed: (see below)    ______________________________________________________________________    Medications: Current Facility-Administered Medications   Medication Dose Route Frequency    sodium chloride (NS) flush 5-40 mL  5-40 mL IntraVENous Q8H    sodium chloride (NS) flush 5-40 mL  5-40 mL IntraVENous PRN    acetaminophen (TYLENOL) tablet 650 mg  650 mg Oral Q6H PRN    Or    acetaminophen (TYLENOL) suppository 650 mg  650 mg Rectal Q6H PRN    polyethylene glycol (MIRALAX) packet 17 g  17 g Oral DAILY PRN    ondansetron (ZOFRAN ODT) tablet 4 mg  4 mg Oral Q8H PRN    Or    ondansetron (ZOFRAN) injection 4 mg  4 mg IntraVENous Q6H PRN    enoxaparin (LOVENOX) injection 40 mg  40 mg SubCUTAneous DAILY    cefTRIAXone (ROCEPHIN) 1 g in 0.9% sodium chloride 10 mL IV syringe  1 g IntraVENous Q24H    azithromycin (ZITHROMAX) tablet 500 mg  500 mg Oral DAILY    albuterol-ipratropium (DUO-NEB) 2.5 MG-0.5 MG/3 ML  3 mL Nebulization Q4H PRN    insulin glargine (LANTUS) injection 24 Units  24 Units SubCUTAneous DAILY    metFORMIN (GLUCOPHAGE) tablet 500 mg  500 mg Oral BID WITH MEALS    insulin lispro (HUMALOG) injection   SubCUTAneous QID WITH MEALS    glucose chewable tablet 16 g  4 Tablet Oral PRN    glucagon (GLUCAGEN) injection 1 mg  1 mg IntraMUSCular PRN    dextrose 10% infusion 0-250 mL  0-250 mL IntraVENous PRN          Lab Review:     Recent Labs     05/09/22  0008 05/07/22 2110   WBC 11.1* 12.1*   HGB 12.2 15.6   HCT 36.1 45.3    303     Recent Labs     05/09/22  0008 05/08/22  1202 05/08/22  0820 05/08/22  0249 05/08/22  0249 05/07/22  2110 05/07/22  2110    140 139   < > 137   < > 131*   K 3.4* 3.8 2.7*   < > 3.0*   < > 3.4*   * 115* 111*   < > 107   < > 98   CO2 17* 16* 15*   < > 15*   < > 13*   GLU 76 211* 182*   < > 142*   < > 386*   BUN 6 7 7   < > 9   < > 10   CREA 0.59 0.61 0.59   < > 0.62   < > 0.93   CA 8.7 8.8 8.5   < > 9.5   < > 10.2*   MG  --  1.8  --   --  2.0  --   --    PHOS  --   --   --   --  1.5*  --   --    ALB 2.4*  --   --   --   --   --  3.5   TBILI 0.2  -- --   --   --   --  0.5   ALT 16  --   --   --   --   --  20    < > = values in this interval not displayed. Lab Results   Component Value Date/Time    Glucose (POC) 139 (H) 05/09/2022 11:19 AM    Glucose (POC) 200 (H) 05/09/2022 07:54 AM    Glucose (POC) 153 (H) 05/09/2022 04:25 AM    Glucose (POC) 84 05/09/2022 01:05 AM    Glucose (POC) 372 (H) 05/08/2022 08:49 PM          Assessment / Plan:      Active Problems:    Diabetic ketoacidosis ([facility] [address] (5/8/2022)    1 DKA new onset diabetes, patient previous insulin drip off, currently on long-acting Lantus plus insulin/scale with Accu-Cheks before meals and at bedtime, hemoglobin A1c elevated at 13, patient will continue with metformin for now, patient likely might require insulin afterwards, anion gap closed from 20-10, bicarb improved from 14-17, likely precipitated as patient has been taking on an outpatient basis and steroids,    2 upper respiratory infection/bronchitis, white count is stable, clinically not highly concerning for pneumonia for now, may give a very short course of antibiotics for now, overall clinically doing better    3 history of asthma, no acute exacerbation, DuoNebs as needed, patient has been on a prolonged course of steroids prescribed by primary care doctor stable for now,    4 history of smoking abuse, patient counseled on the need to stop smoking                     Care Plan discussed with: Patient    Discussed:  Care Plan    Prophylaxis:  Lovenox    Disposition:  Home w/Family           ___________________________________________________    Attending Physician: Alesha Roque MD

## 2022-05-09 NOTE — PROGRESS NOTES
Spoke with Dr Josie Waller. ICU signing off. Will not staff from ICU perspective today. Gap closed x2 on basal/bolus insulin. Patient on RA. [place] CATHI Morley  Sound CCM.

## 2022-05-09 NOTE — PROGRESS NOTES
TRANSFER - IN REPORT:    Verbal report received from Toby(name) on Richar Shepherd  being received from ICU(unit) for routine progression of care      Report consisted of patients Situation, Background, Assessment and   Recommendations(SBAR). Information from the following report(s) SBAR, Kardex, Intake/Output and MAR was reviewed with the receiving nurse. Opportunity for questions and clarification was provided. Assessment completed upon patients arrival to unit and care assumed. Tele box #56. Pt oriented to room. VSS. Pt reports daughter will be here tomorrow 8/11 at 56 for discharge. 1930 Bedside shift change report given to Coreen (oncoming nurse) by Corazon Frias (offgoing nurse). Report included the following information SBAR, Kardex, Intake/Output and MAR.

## 2022-05-09 NOTE — PROGRESS NOTES
0730-report received from off going [address] and care resumed for abelino TALBERT completed and whiteboard updated, introductions made, pt appears to be resting in bed at this time, c/o feeling tired today, will monitor  0900-both IVs removed due to infiltration, x1 PIV placed, hospitalist at the bedside to assess pt, pt educated on DM again, this nurse reached out to educator for more patient education  1150-pt stated she felt nauseous; pt given PRN ZOFRAN  1230-pt stated she feels better due to [facility]   1700-shift remains uneventful at this time  1740-report given to [facility] RN  1800-pt transfer via wheelchair, tolerated well, placed on remote tele

## 2022-05-09 NOTE — PROGRESS NOTES
Problem: Falls - Risk of  Goal: *Absence of Falls  Description: Document Cindia Neigh Fall Risk and appropriate interventions in the flowsheet. Outcome: Progressing Towards Goal  Note: Fall Risk Interventions:            Medication Interventions: Assess postural VS orthostatic hypotension,Bed/chair exit alarm,Evaluate medications/consider consulting pharmacy,Patient to call before getting OOB,Teach patient to arise slowly    Elimination Interventions: Bed/chair exit alarm,Call light in reach,Patient to call for help with toileting needs,Toilet paper/wipes in reach,Toileting schedule/hourly rounds    History of Falls Interventions: Door open when patient unattended,Room close to nurse's station,Evaluate medications/consider consulting pharmacy         Problem: Patient Education: Go to Patient Education Activity  Goal: Patient/Family Education  Outcome: Progressing Towards Goal     Problem: Diabetes Self-Management  Goal: *Disease process and treatment process  Description: Define diabetes and identify own type of diabetes; list 3 options for treating diabetes. Outcome: Progressing Towards Goal  Goal: *Incorporating nutritional management into lifestyle  Description: Describe effect of type, amount and timing of food on blood glucose; list 3 methods for planning meals. Outcome: Progressing Towards Goal  Goal: *Incorporating physical activity into lifestyle  Description: State effect of exercise on blood glucose levels. Outcome: Progressing Towards Goal  Goal: *Developing strategies to promote health/change behavior  Description: Define the ABC's of diabetes; identify appropriate screenings, schedule and personal plan for screenings. Outcome: Progressing Towards Goal  Goal: *Using medications safely  Description: State effect of diabetes medications on diabetes; name diabetes medication taking, action and side effects.   Outcome: Progressing Towards Goal  Goal: *Monitoring blood glucose, interpreting and using results  Description: Identify recommended blood glucose targets  and personal targets. Outcome: Progressing Towards Goal  Goal: *Prevention, detection, treatment of acute complications  Description: List symptoms of hyper- and hypoglycemia; describe how to treat low blood sugar and actions for lowering  high blood glucose level. Outcome: Progressing Towards Goal  Goal: *Prevention, detection and treatment of chronic complications  Description: Define the natural course of diabetes and describe the relationship of blood glucose levels to long term complications of diabetes.   Outcome: Progressing Towards Goal  Goal: *Developing strategies to address psychosocial issues  Description: Describe feelings about living with diabetes; identify support needed and support network  Outcome: Progressing Towards Goal  Goal: *Insulin pump training  Outcome: Progressing Towards Goal  Goal: *Sick day guidelines  Outcome: Progressing Towards Goal  Goal: *Patient Specific Goal (EDIT GOAL, INSERT TEXT)  Outcome: Progressing Towards Goal     Problem: Patient Education: Go to Patient Education Activity  Goal: Patient/Family Education  Outcome: Progressing Towards Goal

## 2022-05-10 NOTE — PROGRESS NOTES
Patient stated she wants to leave AMA her words was get the F,,, papers her daughter is on the way. Called NP and hospitalist so. Dr. Dulce Plaza advised me to let her shower put orders however patient stated she is leaving anyway. Per doctor called patient in room to speak with her.

## 2022-05-10 NOTE — PROGRESS NOTES
Tried calling  to see if he was coming to the floor so patient can sign AMA paper, patient has left stating her daughter is down stairs waiting on her. No answer from doctor.

## 2022-05-10 NOTE — PROGRESS NOTES
I spoke to the patient at length. She has decided to leave against medical advice (AMA). She stated that she did not want to take insulin and was also unhappy with nursing care. I explained to her that without insulin, she could die from DKA. She understood the benefits of staying and the risks for leaving. She will sign the AMA papers.

## 2022-05-10 NOTE — PROGRESS NOTES
Problem: Falls - Risk of  Goal: *Absence of Falls  Description: Document Morene Hole Fall Risk and appropriate interventions in the flowsheet. Outcome: Progressing Towards Goal  Note: Fall Risk Interventions:            Medication Interventions: Teach patient to arise slowly    Elimination Interventions: Call light in reach    History of Falls Interventions: Bed/chair exit alarm         Problem: Diabetes Self-Management  Goal: *Disease process and treatment process  Description: Define diabetes and identify own type of diabetes; list 3 options for treating diabetes. Outcome: Progressing Towards Goal  Goal: *Incorporating nutritional management into lifestyle  Description: Describe effect of type, amount and timing of food on blood glucose; list 3 methods for planning meals. Outcome: Progressing Towards Goal  Goal: *Incorporating physical activity into lifestyle  Description: State effect of exercise on blood glucose levels. Outcome: Progressing Towards Goal  Goal: *Developing strategies to promote health/change behavior  Description: Define the ABC's of diabetes; identify appropriate screenings, schedule and personal plan for screenings. Outcome: Progressing Towards Goal  Goal: *Using medications safely  Description: State effect of diabetes medications on diabetes; name diabetes medication taking, action and side effects. Outcome: Progressing Towards Goal  Goal: *Monitoring blood glucose, interpreting and using results  Description: Identify recommended blood glucose targets  and personal targets. Outcome: Progressing Towards Goal  Goal: *Prevention, detection, treatment of acute complications  Description: List symptoms of hyper- and hypoglycemia; describe how to treat low blood sugar and actions for lowering  high blood glucose level.   Outcome: Progressing Towards Goal  Goal: *Prevention, detection and treatment of chronic complications  Description: Define the natural course of diabetes and describe the relationship of blood glucose levels to long term complications of diabetes.   Outcome: Progressing Towards Goal  Goal: *Developing strategies to address psychosocial issues  Description: Describe feelings about living with diabetes; identify support needed and support network  Outcome: Progressing Towards Goal  Goal: *Insulin pump training  Outcome: Progressing Towards Goal  Goal: *Sick day guidelines  Outcome: Progressing Towards Goal  Goal: *Patient Specific Goal (EDIT GOAL, INSERT TEXT)  Outcome: Progressing Towards Goal     Problem: Patient Education: Go to Patient Education Activity  Goal: Patient/Family Education  Outcome: Progressing Towards Goal

## 2022-05-10 NOTE — PROGRESS NOTES
Removed patient IV asked her if daughter going to come up and get her she said no she is going to walk down by herself with her belongings.

## 2022-07-26 DIAGNOSIS — M75.42 IMPINGEMENT SYNDROME OF LEFT SHOULDER: ICD-10-CM

## 2022-07-26 DIAGNOSIS — M54.12 CERVICAL RADICULOPATHY: ICD-10-CM

## 2022-07-26 DIAGNOSIS — M25.512 ACUTE PAIN OF LEFT SHOULDER: ICD-10-CM

## 2022-07-26 RX ORDER — DICLOFENAC SODIUM 75 MG/1
TABLET, DELAYED RELEASE ORAL
Qty: 60 TABLET | Refills: 3 | Status: SHIPPED | OUTPATIENT
Start: 2022-07-26